# Patient Record
Sex: FEMALE
[De-identification: names, ages, dates, MRNs, and addresses within clinical notes are randomized per-mention and may not be internally consistent; named-entity substitution may affect disease eponyms.]

---

## 2017-02-28 ENCOUNTER — APPOINTMENT (OUTPATIENT)
Dept: GYNECOLOGIC ONCOLOGY | Facility: CLINIC | Age: 83
End: 2017-02-28

## 2017-02-28 VITALS
BODY MASS INDEX: 32.75 KG/M2 | SYSTOLIC BLOOD PRESSURE: 136 MMHG | DIASTOLIC BLOOD PRESSURE: 78 MMHG | HEIGHT: 57.87 IN | WEIGHT: 156 LBS

## 2017-02-28 DIAGNOSIS — I10 ESSENTIAL (PRIMARY) HYPERTENSION: ICD-10-CM

## 2017-02-28 DIAGNOSIS — Z86.39 PERSONAL HISTORY OF OTHER ENDOCRINE, NUTRITIONAL AND METABOLIC DISEASE: ICD-10-CM

## 2017-02-28 DIAGNOSIS — N94.9 UNSPECIFIED CONDITION ASSOCIATED WITH FEMALE GENITAL ORGANS AND MENSTRUAL CYCLE: ICD-10-CM

## 2017-05-24 ENCOUNTER — APPOINTMENT (OUTPATIENT)
Dept: OBGYN | Facility: CLINIC | Age: 83
End: 2017-05-24

## 2017-06-23 ENCOUNTER — APPOINTMENT (OUTPATIENT)
Dept: GYNECOLOGIC ONCOLOGY | Facility: HOSPITAL | Age: 83
End: 2017-06-23

## 2017-06-26 ENCOUNTER — APPOINTMENT (OUTPATIENT)
Dept: GYNECOLOGIC ONCOLOGY | Facility: CLINIC | Age: 83
End: 2017-06-26

## 2017-07-17 ENCOUNTER — APPOINTMENT (OUTPATIENT)
Dept: GYNECOLOGIC ONCOLOGY | Facility: CLINIC | Age: 83
End: 2017-07-17

## 2017-07-17 VITALS
DIASTOLIC BLOOD PRESSURE: 88 MMHG | SYSTOLIC BLOOD PRESSURE: 132 MMHG | TEMPERATURE: 97.7 F | BODY MASS INDEX: 31.5 KG/M2 | WEIGHT: 146 LBS | HEIGHT: 57 IN | HEART RATE: 64 BPM

## 2018-07-25 PROBLEM — N94.9 ADNEXAL MASS: Status: ACTIVE | Noted: 2017-02-28

## 2022-12-22 ENCOUNTER — NON-APPOINTMENT (OUTPATIENT)
Age: 88
End: 2022-12-22

## 2022-12-22 ENCOUNTER — APPOINTMENT (OUTPATIENT)
Dept: OPHTHALMOLOGY | Facility: CLINIC | Age: 88
End: 2022-12-22

## 2022-12-22 PROCEDURE — 99204 OFFICE O/P NEW MOD 45 MIN: CPT

## 2023-01-09 ENCOUNTER — APPOINTMENT (OUTPATIENT)
Dept: OPHTHALMOLOGY | Facility: CLINIC | Age: 89
End: 2023-01-09

## 2023-02-01 ENCOUNTER — APPOINTMENT (OUTPATIENT)
Dept: OPHTHALMOLOGY | Facility: EYE CENTER | Age: 89
End: 2023-02-01

## 2023-02-02 ENCOUNTER — APPOINTMENT (OUTPATIENT)
Dept: OPHTHALMOLOGY | Facility: CLINIC | Age: 89
End: 2023-02-02

## 2025-06-09 ENCOUNTER — INPATIENT (INPATIENT)
Facility: HOSPITAL | Age: 89
LOS: 10 days | Discharge: HOME HEALTH | End: 2025-06-20
Attending: SURGERY | Admitting: SURGERY
Payer: MEDICARE

## 2025-06-09 VITALS
DIASTOLIC BLOOD PRESSURE: 60 MMHG | TEMPERATURE: 98 F | RESPIRATION RATE: 18 BRPM | OXYGEN SATURATION: 95 % | HEART RATE: 86 BPM | SYSTOLIC BLOOD PRESSURE: 108 MMHG

## 2025-06-09 LAB
ALBUMIN SERPL ELPH-MCNC: 2.9 G/DL — LOW (ref 3.3–5)
ALP SERPL-CCNC: 105 U/L — SIGNIFICANT CHANGE UP (ref 40–120)
ALT FLD-CCNC: 16 U/L — SIGNIFICANT CHANGE UP (ref 4–33)
ANION GAP SERPL CALC-SCNC: 15 MMOL/L — HIGH (ref 7–14)
APPEARANCE UR: ABNORMAL
APTT BLD: 25.1 SEC — LOW (ref 26.1–36.8)
AST SERPL-CCNC: 22 U/L — SIGNIFICANT CHANGE UP (ref 4–32)
BASOPHILS # BLD AUTO: 0.04 K/UL — SIGNIFICANT CHANGE UP (ref 0–0.2)
BASOPHILS NFR BLD AUTO: 0.3 % — SIGNIFICANT CHANGE UP (ref 0–2)
BILIRUB SERPL-MCNC: 0.4 MG/DL — SIGNIFICANT CHANGE UP (ref 0.2–1.2)
BILIRUB UR-MCNC: NEGATIVE — SIGNIFICANT CHANGE UP
BLOOD GAS VENOUS COMPREHENSIVE RESULT: SIGNIFICANT CHANGE UP
BUN SERPL-MCNC: 17 MG/DL — SIGNIFICANT CHANGE UP (ref 7–23)
CALCIUM SERPL-MCNC: 8.7 MG/DL — SIGNIFICANT CHANGE UP (ref 8.4–10.5)
CHLORIDE SERPL-SCNC: 93 MMOL/L — LOW (ref 98–107)
CO2 SERPL-SCNC: 20 MMOL/L — LOW (ref 22–31)
COLOR SPEC: YELLOW — SIGNIFICANT CHANGE UP
CREAT SERPL-MCNC: 1.18 MG/DL — SIGNIFICANT CHANGE UP (ref 0.5–1.3)
DIFF PNL FLD: NEGATIVE — SIGNIFICANT CHANGE UP
EGFR: 43 ML/MIN/1.73M2 — LOW
EGFR: 43 ML/MIN/1.73M2 — LOW
EOSINOPHIL # BLD AUTO: 0.18 K/UL — SIGNIFICANT CHANGE UP (ref 0–0.5)
EOSINOPHIL NFR BLD AUTO: 1.2 % — SIGNIFICANT CHANGE UP (ref 0–6)
GLUCOSE SERPL-MCNC: 103 MG/DL — HIGH (ref 70–99)
GLUCOSE UR QL: NEGATIVE MG/DL — SIGNIFICANT CHANGE UP
HCT VFR BLD CALC: 27.3 % — LOW (ref 34.5–45)
HGB BLD-MCNC: 8.2 G/DL — LOW (ref 11.5–15.5)
IANC: 11.38 K/UL — HIGH (ref 1.8–7.4)
IMM GRANULOCYTES NFR BLD AUTO: 0.6 % — SIGNIFICANT CHANGE UP (ref 0–0.9)
INR BLD: 1.01 RATIO — SIGNIFICANT CHANGE UP (ref 0.85–1.16)
KETONES UR QL: NEGATIVE MG/DL — SIGNIFICANT CHANGE UP
LACTATE SERPL-SCNC: 2.3 MMOL/L — HIGH (ref 0.5–2)
LEUKOCYTE ESTERASE UR-ACNC: ABNORMAL
LIDOCAIN IGE QN: 36 U/L — SIGNIFICANT CHANGE UP (ref 7–60)
LYMPHOCYTES # BLD AUTO: 1.72 K/UL — SIGNIFICANT CHANGE UP (ref 1–3.3)
LYMPHOCYTES # BLD AUTO: 11.9 % — LOW (ref 13–44)
MCHC RBC-ENTMCNC: 20.9 PG — LOW (ref 27–34)
MCHC RBC-ENTMCNC: 30 G/DL — LOW (ref 32–36)
MCV RBC AUTO: 69.6 FL — LOW (ref 80–100)
MONOCYTES # BLD AUTO: 1.1 K/UL — HIGH (ref 0–0.9)
MONOCYTES NFR BLD AUTO: 7.6 % — SIGNIFICANT CHANGE UP (ref 2–14)
NEUTROPHILS # BLD AUTO: 11.38 K/UL — HIGH (ref 1.8–7.4)
NEUTROPHILS NFR BLD AUTO: 78.4 % — HIGH (ref 43–77)
NITRITE UR-MCNC: NEGATIVE — SIGNIFICANT CHANGE UP
NRBC # BLD AUTO: 0 K/UL — SIGNIFICANT CHANGE UP (ref 0–0)
NRBC # FLD: 0 K/UL — SIGNIFICANT CHANGE UP (ref 0–0)
NRBC BLD AUTO-RTO: 0 /100 WBCS — SIGNIFICANT CHANGE UP (ref 0–0)
PH UR: 8 — SIGNIFICANT CHANGE UP (ref 5–8)
PLATELET # BLD AUTO: 471 K/UL — HIGH (ref 150–400)
POTASSIUM SERPL-MCNC: 5.6 MMOL/L — HIGH (ref 3.5–5.3)
POTASSIUM SERPL-SCNC: 5.6 MMOL/L — HIGH (ref 3.5–5.3)
PROT SERPL-MCNC: 7.4 G/DL — SIGNIFICANT CHANGE UP (ref 6–8.3)
PROT UR-MCNC: NEGATIVE MG/DL — SIGNIFICANT CHANGE UP
PROTHROM AB SERPL-ACNC: 11.7 SEC — SIGNIFICANT CHANGE UP (ref 9.9–13.4)
RBC # BLD: 3.92 M/UL — SIGNIFICANT CHANGE UP (ref 3.8–5.2)
RBC # FLD: 22.8 % — HIGH (ref 10.3–14.5)
SODIUM SERPL-SCNC: 128 MMOL/L — LOW (ref 135–145)
SP GR SPEC: 1.01 — SIGNIFICANT CHANGE UP (ref 1–1.03)
UROBILINOGEN FLD QL: 1 MG/DL — SIGNIFICANT CHANGE UP (ref 0.2–1)
WBC # BLD: 14.51 K/UL — HIGH (ref 3.8–10.5)
WBC # FLD AUTO: 14.51 K/UL — HIGH (ref 3.8–10.5)

## 2025-06-09 PROCEDURE — 99285 EMERGENCY DEPT VISIT HI MDM: CPT | Mod: GC

## 2025-06-09 RX ORDER — ACETAMINOPHEN 500 MG/5ML
1000 LIQUID (ML) ORAL ONCE
Refills: 0 | Status: COMPLETED | OUTPATIENT
Start: 2025-06-09 | End: 2025-06-09

## 2025-06-09 RX ADMIN — Medication 400 MILLIGRAM(S): at 23:09

## 2025-06-09 NOTE — ED PROVIDER NOTE - CLINICAL SUMMARY MEDICAL DECISION MAKING FREE TEXT BOX
Patient with suprapubic and left lower quadrant tenderness concerning for UTI, intra-abdominal abscess patient with colon cancer, colon cancer related fever will obtain basic labs UA UC CT abdomen pelvis dispo pending results.

## 2025-06-09 NOTE — ED ADULT NURSE NOTE - OBJECTIVE STATEMENT
Patient received in room 3. Patient is AOx4, ambulatory at baseline, able to speak in full sentences, c/o fevers and abdominal pain. Patient has a past medical history of colon ca (patient unaware, but family at bedside states they don't want her to know), DM, cardiac stent, HTN, HLD. Patient c/o fevers, low blood pressures, and abdominal pain associated with blood in stool. Patient family endorses a recent colonoscopy on 6/5. Patient appears comfortable on stretcher in no signs of acute distress. VS noted. 20G placed in right wrist; labs drawn and sent. Patient pending CT scan. Patient denies chest pain, SOB, N/V, chills or weakness. Respirations even and unlabored, chest rise symmetrical b/l. Comfort measures maintained. Family at bedside. Bed in lowest position. Safety maintained.

## 2025-06-09 NOTE — ED PROVIDER NOTE - OBJECTIVE STATEMENT
89-year-old female PMH colon cancer, diabetes, CAD, hypertension, hyperlipidemia presenting with fever  Patient presents with fever Tmax 39.4 sporadic throughout the day.  Patient denies dysuria cough increased urinary frequency shortness of breath runny nose congestion.  Patient was recently diagnosed with colon cancer and last transfusion due to GI bleeding was last Thursday.  Patient also endorsing shooting pain down the left lower extremity coming from mid gluteal region.

## 2025-06-09 NOTE — ED PROVIDER NOTE - PHYSICAL EXAMINATION
GENERAL: NAD, lying in bed comfortably  HEAD:  Atraumatic, normocephalic  HEART: Regular rate and rhythm, no murmurs, rubs, or gallops  LUNGS: Unlabored respirations.  Clear to auscultation bilaterally, no crackles, wheezing, or rhonchi  ABDOMEN: Soft, Suprapubic tenderness and LLQ tenderness, nondistended, +BS, No CVA tenderness  EXTREMITIES: 2+ peripheral pulses bilaterally. No clubbing, cyanosis, or edema  NERVOUS SYSTEM:  A&Ox3, moving all extremities, no focal deficits   SKIN: No rashes or lesions

## 2025-06-09 NOTE — ED ADULT NURSE REASSESSMENT NOTE - NS ED NURSE REASSESS COMMENT FT1
Patient noted with fever; MD Blake made aware. Patient medicated per chart. 20G placed in LAC; labs drawn and sent. Urine collected and sent. Patient c/o shivering; Tylenol IVPB given. Respirations even and unlabored, chest rise symmetrical b/l. Comfort measures maintained. Bed in lowest position. Safety maintained.

## 2025-06-09 NOTE — ED PROVIDER NOTE - ATTENDING CONTRIBUTION TO CARE
Brief HPI:  89 year old F (Gibraltarian speaking, requesting son at bedside provide interpretation), recently diagnosed colon ca, cad, htn, hld presents with fever, diarrhea.  Patient with recent hospitalization at [facility] with newly diagnosed sigmoid adenocarcinoma without initation of treatment s/p colonoscopy on 5/29.  Since then family reports fever and blood tinged diarrhea.  No recent travel.  Patient is not on anticoagulation.  No reported recent abx use.  Denies abdominal pain.       Vitals:   Reviewed    Exam:    GEN:  Non-toxic appearing, non-distressed, speaking full sentences, non-diaphoretic, AAOx3  HEENT:  NCAT, neck supple, EOMI, PERRLA, sclera anicteric, no conjunctival pallor or injection, no stridor, normal voice, no tonsillar exudate, uvula midline  CV:  regular rhythm and rate, s1/s2 audible, no murmurs, rubs or gallops, peripheral pulses 2+ and symmetric  PULM:  non-labored respirations, lungs clear to auscultation bilaterally, no wheezes, crackles or rales  ABD:  non distended, non-tender, no rebound, no guarding, negative Espinoza's sign, bowel sounds normal, no cvat  MSK:  no gross deformity, non-tender extremities and joints, range of motion grossly normal appearing, no extremity edema, extremities warm and well perfused   NEURO:  AAOx3, CN II-XII intact, motor 5/5 in upper and lower extremities bilaterally, sensation grossly intact in extremities and trunk, no gait deficit  SKIN:  warm, dry, no rash or vesicles     A/P:   89 year old F (Gibraltarian speaking, requesting son at bedside provide interpretation), recently diagnosed colon ca, cad, htn, hld presents with fever, diarrhea.  VSS. Exam non-toxic appearing, abdomen non-tender.  Possible infectious diarrhea.  Given recent instrumentation and fever, will obtain ctap.  Disposition pending.

## 2025-06-09 NOTE — ED PROVIDER NOTE - PROGRESS NOTE DETAILS
Porfirio Farris MD PGY3: CT concerning for perforated diverticular versus perforation associated with cancer.  Discussed with patient's son, surgery consulted and will see patient. Abx given. Urine also with uti.

## 2025-06-09 NOTE — ED ADULT TRIAGE NOTE - CHIEF COMPLAINT QUOTE
pt coming from home, c/o fever and blood in stool x 3 days.  pt is s/p colonoscopy 6/5.  Hx: colon ca, DM, cardiac stent, HTN, HLD.  pt denies blood thinners.

## 2025-06-09 NOTE — ED PROVIDER NOTE - IV ALTEPLASE INCLUSION HIDDEN
show You received your rabies vaccine for day 0 and day 3. Please return to ED to receive your day 7 and day 14 of the rabies vaccine series.       RABIES VACCINE - General Information     Rabies Vaccine    WHAT YOU NEED TO KNOW:    What is the rabies vaccine? The rabies vaccine is an injection given to help prevent rabies. The rabies virus is spread to humans through the bite of an infected animal. Dogs, bats, skunks, coyotes, raccoons, and foxes are examples of animals that can carry rabies. The rabies vaccine can protect you from being infected with the virus. The vaccine can also prevent you from developing rabies even if you get it after you were bitten by an animal.     When is the vaccine given? Your healthcare provider will tell you how many doses of the vaccine you need. You may need booster shots if you are at high risk for rabies. The following is a common dosing schedule:     Before exposure to the virus, the vaccine is given in 3 doses. The first dose can be given at any time. The second dose is given 7 days after the first. The third dose is given 21 to 28 days after the first. Plan to get all of the doses 3 to 4 weeks before you travel.     After exposure to the virus, the vaccine is given in either 2 or 4 doses:   A person who has not already had the vaccine will usually get 4 doses. The first dose is given immediately. The other doses are given on days 3, 7, and 14 after the exposure. A shot called Rabies Immune Globulin is given at the same time as the first dose. This medicine helps increase your immune system to fight infection.     A person who has already had the vaccine will usually get 2 doses. The first is given immediately, and the second is given on day 3 after the exposure. Rabies Immune Globulin is not given.    Who should get the rabies vaccine?     Anyone who was bitten by an animal that can carry rabies may need the vaccine  Anyone at high risk for rabies, such as people who work with or care for animals or in a rabies laboratory  Anyone who goes into caves where bats live  Anyone who may have had contact with an infected animal  Anyone traveling to another country where rabies is common    Who should not get the rabies vaccine or should wait to get it?     Tell your healthcare provider if you had a severe allergic reaction to a dose of the rabies vaccine in the past, or to other vaccines. If you are getting the vaccine before exposure, do not get another dose. After exposure, you need to get all the doses even if you are at risk for an allergic reaction. Your healthcare provider may need to take extra precautions before you get another dose.    Tell your healthcare provider about all of your allergies. Also tell him if you have a disease that affects your immune system (such as HIV/AIDS) or you have cancer. Tell him if you are taking medicines that affect your immune system or any cancer treatment drug or radiation. Tell him if you are ill. You may need to wait to get the vaccine until you feel better.     What are the risks of the rabies vaccine? You may have a severe allergic reaction. The area where you got the shot may become red, swollen, or painful. You may develop a headache or muscle aches. You may have nausea or pain in your abdomen. You may develop rabies even after you get the vaccine.    Call 911 for any of the following:     Your mouth and throat are swollen.  You are wheezing or have trouble breathing.  You have chest pain or your heart is beating faster than normal for you.  You feel like you are going to faint.    When should I seek immediate care?     Your face is red or swollen.      You have hives that spread over your body.  You feel weak or dizzy.    When should I contact my healthcare provider?     You have increased pain, redness, or swelling around the area where the shot was given.  You have flu-like symptoms.    You have questions or concerns about the rabies vaccine.    CARE AGREEMENT:    You have the right to help plan your care. Learn about your health condition and how it may be treated. Discuss treatment options with your healthcare providers to decide what care you want to receive. You always have the right to refuse treatment. You received your rabies vaccine for day 0 and day 3. Please return to ED to receive your day 7 and day 14 of the rabies vaccine series on the following dates: 10/17/2022 and 10/24/2022.       RABIES VACCINE - General Information     Rabies Vaccine    WHAT YOU NEED TO KNOW:    What is the rabies vaccine? The rabies vaccine is an injection given to help prevent rabies. The rabies virus is spread to humans through the bite of an infected animal. Dogs, bats, skunks, coyotes, raccoons, and foxes are examples of animals that can carry rabies. The rabies vaccine can protect you from being infected with the virus. The vaccine can also prevent you from developing rabies even if you get it after you were bitten by an animal.     When is the vaccine given? Your healthcare provider will tell you how many doses of the vaccine you need. You may need booster shots if you are at high risk for rabies. The following is a common dosing schedule:     Before exposure to the virus, the vaccine is given in 3 doses. The first dose can be given at any time. The second dose is given 7 days after the first. The third dose is given 21 to 28 days after the first. Plan to get all of the doses 3 to 4 weeks before you travel.     After exposure to the virus, the vaccine is given in either 2 or 4 doses:   A person who has not already had the vaccine will usually get 4 doses. The first dose is given immediately. The other doses are given on days 3, 7, and 14 after the exposure. A shot called Rabies Immune Globulin is given at the same time as the first dose. This medicine helps increase your immune system to fight infection.     A person who has already had the vaccine will usually get 2 doses. The first is given immediately, and the second is given on day 3 after the exposure. Rabies Immune Globulin is not given.    Who should get the rabies vaccine?     Anyone who was bitten by an animal that can carry rabies may need the vaccine  Anyone at high risk for rabies, such as people who work with or care for animals or in a rabies laboratory  Anyone who goes into caves where bats live  Anyone who may have had contact with an infected animal  Anyone traveling to another country where rabies is common    Who should not get the rabies vaccine or should wait to get it?     Tell your healthcare provider if you had a severe allergic reaction to a dose of the rabies vaccine in the past, or to other vaccines. If you are getting the vaccine before exposure, do not get another dose. After exposure, you need to get all the doses even if you are at risk for an allergic reaction. Your healthcare provider may need to take extra precautions before you get another dose.    Tell your healthcare provider about all of your allergies. Also tell him if you have a disease that affects your immune system (such as HIV/AIDS) or you have cancer. Tell him if you are taking medicines that affect your immune system or any cancer treatment drug or radiation. Tell him if you are ill. You may need to wait to get the vaccine until you feel better.     What are the risks of the rabies vaccine? You may have a severe allergic reaction. The area where you got the shot may become red, swollen, or painful. You may develop a headache or muscle aches. You may have nausea or pain in your abdomen. You may develop rabies even after you get the vaccine.    Call 911 for any of the following:     Your mouth and throat are swollen.  You are wheezing or have trouble breathing.  You have chest pain or your heart is beating faster than normal for you.  You feel like you are going to faint.    When should I seek immediate care?     Your face is red or swollen.      You have hives that spread over your body.  You feel weak or dizzy.    When should I contact my healthcare provider?     You have increased pain, redness, or swelling around the area where the shot was given.  You have flu-like symptoms.    You have questions or concerns about the rabies vaccine.    CARE AGREEMENT:    You have the right to help plan your care. Learn about your health condition and how it may be treated. Discuss treatment options with your healthcare providers to decide what care you want to receive. You always have the right to refuse treatment.

## 2025-06-10 ENCOUNTER — TRANSCRIPTION ENCOUNTER (OUTPATIENT)
Age: 89
End: 2025-06-10

## 2025-06-10 DIAGNOSIS — K63.1 PERFORATION OF INTESTINE (NONTRAUMATIC): ICD-10-CM

## 2025-06-10 LAB
ANION GAP SERPL CALC-SCNC: 13 MMOL/L — SIGNIFICANT CHANGE UP (ref 7–14)
APPEARANCE UR: CLEAR — SIGNIFICANT CHANGE UP
BACTERIA # UR AUTO: ABNORMAL /HPF
BACTERIA # UR AUTO: NEGATIVE /HPF — SIGNIFICANT CHANGE UP
BILIRUB UR-MCNC: NEGATIVE — SIGNIFICANT CHANGE UP
BLD GP AB SCN SERPL QL: NEGATIVE — SIGNIFICANT CHANGE UP
BUN SERPL-MCNC: 15 MG/DL — SIGNIFICANT CHANGE UP (ref 7–23)
CALCIUM SERPL-MCNC: 8.6 MG/DL — SIGNIFICANT CHANGE UP (ref 8.4–10.5)
CAST: 1 /LPF — SIGNIFICANT CHANGE UP (ref 0–4)
CAST: 2 /LPF — SIGNIFICANT CHANGE UP (ref 0–4)
CHLORIDE SERPL-SCNC: 98 MMOL/L — SIGNIFICANT CHANGE UP (ref 98–107)
CO2 SERPL-SCNC: 22 MMOL/L — SIGNIFICANT CHANGE UP (ref 22–31)
COLOR SPEC: YELLOW — SIGNIFICANT CHANGE UP
CREAT SERPL-MCNC: 1.24 MG/DL — SIGNIFICANT CHANGE UP (ref 0.5–1.3)
DIFF PNL FLD: NEGATIVE — SIGNIFICANT CHANGE UP
EGFR: 41 ML/MIN/1.73M2 — LOW
EGFR: 41 ML/MIN/1.73M2 — LOW
GLUCOSE SERPL-MCNC: 128 MG/DL — HIGH (ref 70–99)
GLUCOSE UR QL: NEGATIVE MG/DL — SIGNIFICANT CHANGE UP
HCT VFR BLD CALC: 23.8 % — LOW (ref 34.5–45)
HCT VFR BLD CALC: 29.7 % — LOW (ref 34.5–45)
HGB BLD-MCNC: 7.2 G/DL — LOW (ref 11.5–15.5)
HGB BLD-MCNC: 9.1 G/DL — LOW (ref 11.5–15.5)
KETONES UR QL: NEGATIVE MG/DL — SIGNIFICANT CHANGE UP
LEUKOCYTE ESTERASE UR-ACNC: ABNORMAL
MAGNESIUM SERPL-MCNC: 2.4 MG/DL — SIGNIFICANT CHANGE UP (ref 1.6–2.6)
MCHC RBC-ENTMCNC: 20.9 PG — LOW (ref 27–34)
MCHC RBC-ENTMCNC: 21.5 PG — LOW (ref 27–34)
MCHC RBC-ENTMCNC: 30.3 G/DL — LOW (ref 32–36)
MCHC RBC-ENTMCNC: 30.6 G/DL — LOW (ref 32–36)
MCV RBC AUTO: 69 FL — LOW (ref 80–100)
MCV RBC AUTO: 70 FL — LOW (ref 80–100)
NITRITE UR-MCNC: NEGATIVE — SIGNIFICANT CHANGE UP
NRBC # BLD AUTO: 0 K/UL — SIGNIFICANT CHANGE UP (ref 0–0)
NRBC # BLD AUTO: 0 K/UL — SIGNIFICANT CHANGE UP (ref 0–0)
NRBC # FLD: 0 K/UL — SIGNIFICANT CHANGE UP (ref 0–0)
NRBC # FLD: 0 K/UL — SIGNIFICANT CHANGE UP (ref 0–0)
NRBC BLD AUTO-RTO: 0 /100 WBCS — SIGNIFICANT CHANGE UP (ref 0–0)
NRBC BLD AUTO-RTO: 0 /100 WBCS — SIGNIFICANT CHANGE UP (ref 0–0)
PH UR: 7.5 — SIGNIFICANT CHANGE UP (ref 5–8)
PHOSPHATE SERPL-MCNC: 4.2 MG/DL — SIGNIFICANT CHANGE UP (ref 2.5–4.5)
PLATELET # BLD AUTO: 479 K/UL — HIGH (ref 150–400)
PLATELET # BLD AUTO: 508 K/UL — HIGH (ref 150–400)
POTASSIUM SERPL-MCNC: 5.1 MMOL/L — SIGNIFICANT CHANGE UP (ref 3.5–5.3)
POTASSIUM SERPL-SCNC: 5.1 MMOL/L — SIGNIFICANT CHANGE UP (ref 3.5–5.3)
PROT UR-MCNC: NEGATIVE MG/DL — SIGNIFICANT CHANGE UP
RBC # BLD: 3.45 M/UL — LOW (ref 3.8–5.2)
RBC # BLD: 4.24 M/UL — SIGNIFICANT CHANGE UP (ref 3.8–5.2)
RBC # FLD: 21.6 % — HIGH (ref 10.3–14.5)
RBC # FLD: 22.6 % — HIGH (ref 10.3–14.5)
RBC CASTS # UR COMP ASSIST: 0 /HPF — SIGNIFICANT CHANGE UP (ref 0–4)
RBC CASTS # UR COMP ASSIST: 1 /HPF — SIGNIFICANT CHANGE UP (ref 0–4)
REVIEW: SIGNIFICANT CHANGE UP
RH IG SCN BLD-IMP: POSITIVE — SIGNIFICANT CHANGE UP
SODIUM SERPL-SCNC: 133 MMOL/L — LOW (ref 135–145)
SP GR SPEC: 1.01 — SIGNIFICANT CHANGE UP (ref 1–1.03)
SQUAMOUS # UR AUTO: 16 /HPF — HIGH (ref 0–5)
SQUAMOUS # UR AUTO: 4 /HPF — SIGNIFICANT CHANGE UP (ref 0–5)
UROBILINOGEN FLD QL: 0.2 MG/DL — SIGNIFICANT CHANGE UP (ref 0.2–1)
WBC # BLD: 12.87 K/UL — HIGH (ref 3.8–10.5)
WBC # BLD: 15.35 K/UL — HIGH (ref 3.8–10.5)
WBC # FLD AUTO: 12.87 K/UL — HIGH (ref 3.8–10.5)
WBC # FLD AUTO: 15.35 K/UL — HIGH (ref 3.8–10.5)
WBC UR QL: 46 /HPF — HIGH (ref 0–5)
WBC UR QL: 5 /HPF — SIGNIFICANT CHANGE UP (ref 0–5)

## 2025-06-10 PROCEDURE — 99497 ADVNCD CARE PLAN 30 MIN: CPT | Mod: 25

## 2025-06-10 PROCEDURE — 74177 CT ABD & PELVIS W/CONTRAST: CPT | Mod: 26

## 2025-06-10 PROCEDURE — 99223 1ST HOSP IP/OBS HIGH 75: CPT

## 2025-06-10 PROCEDURE — 93970 EXTREMITY STUDY: CPT | Mod: 26

## 2025-06-10 RX ORDER — SODIUM CHLORIDE 9 G/1000ML
1000 INJECTION, SOLUTION INTRAVENOUS
Refills: 0 | Status: DISCONTINUED | OUTPATIENT
Start: 2025-06-10 | End: 2025-06-11

## 2025-06-10 RX ORDER — ACETAMINOPHEN 500 MG/5ML
1000 LIQUID (ML) ORAL EVERY 6 HOURS
Refills: 0 | Status: COMPLETED | OUTPATIENT
Start: 2025-06-10 | End: 2025-06-10

## 2025-06-10 RX ORDER — PIPERACILLIN-TAZO-DEXTROSE,ISO 3.375G/5
3.38 IV SOLUTION, PIGGYBACK PREMIX FROZEN(ML) INTRAVENOUS EVERY 8 HOURS
Refills: 0 | Status: DISCONTINUED | OUTPATIENT
Start: 2025-06-10 | End: 2025-06-17

## 2025-06-10 RX ORDER — CEFTRIAXONE 500 MG/1
1000 INJECTION, POWDER, FOR SOLUTION INTRAMUSCULAR; INTRAVENOUS ONCE
Refills: 0 | Status: COMPLETED | OUTPATIENT
Start: 2025-06-10 | End: 2025-06-10

## 2025-06-10 RX ORDER — PIPERACILLIN-TAZO-DEXTROSE,ISO 3.375G/5
3.38 IV SOLUTION, PIGGYBACK PREMIX FROZEN(ML) INTRAVENOUS ONCE
Refills: 0 | Status: COMPLETED | OUTPATIENT
Start: 2025-06-10 | End: 2025-06-10

## 2025-06-10 RX ORDER — SODIUM ZIRCONIUM CYCLOSILICATE 5 G/5G
10 POWDER, FOR SUSPENSION ORAL ONCE
Refills: 0 | Status: COMPLETED | OUTPATIENT
Start: 2025-06-10 | End: 2025-06-10

## 2025-06-10 RX ADMIN — Medication 25 GRAM(S): at 11:00

## 2025-06-10 RX ADMIN — Medication 400 MILLIGRAM(S): at 23:39

## 2025-06-10 RX ADMIN — CEFTRIAXONE 100 MILLIGRAM(S): 500 INJECTION, POWDER, FOR SOLUTION INTRAMUSCULAR; INTRAVENOUS at 01:43

## 2025-06-10 RX ADMIN — Medication 400 MILLIGRAM(S): at 06:49

## 2025-06-10 RX ADMIN — SODIUM CHLORIDE 100 MILLILITER(S): 9 INJECTION, SOLUTION INTRAVENOUS at 11:00

## 2025-06-10 RX ADMIN — Medication 1000 MILLIGRAM(S): at 13:25

## 2025-06-10 RX ADMIN — Medication 1000 MILLILITER(S): at 00:08

## 2025-06-10 RX ADMIN — SODIUM CHLORIDE 100 MILLILITER(S): 9 INJECTION, SOLUTION INTRAVENOUS at 06:49

## 2025-06-10 RX ADMIN — SODIUM CHLORIDE 100 MILLILITER(S): 9 INJECTION, SOLUTION INTRAVENOUS at 23:39

## 2025-06-10 RX ADMIN — Medication 400 MILLIGRAM(S): at 12:25

## 2025-06-10 RX ADMIN — Medication 25 GRAM(S): at 18:45

## 2025-06-10 RX ADMIN — Medication 1000 MILLIGRAM(S): at 18:25

## 2025-06-10 RX ADMIN — SODIUM ZIRCONIUM CYCLOSILICATE 10 GRAM(S): 5 POWDER, FOR SUSPENSION ORAL at 00:34

## 2025-06-10 RX ADMIN — Medication 400 MILLIGRAM(S): at 17:25

## 2025-06-10 RX ADMIN — Medication 200 GRAM(S): at 03:49

## 2025-06-10 RX ADMIN — Medication 1000 MILLIGRAM(S): at 07:49

## 2025-06-10 NOTE — PATIENT PROFILE ADULT - FALL HARM RISK - HARM RISK INTERVENTIONS

## 2025-06-10 NOTE — CONSULT NOTE ADULT - ASSESSMENT
Interventional Radiology    Evaluate for Procedure: Abscess drain    HPI: 89F PMH DM, CAD, HTN, HLD, and recently diagnosed Colon Ca. Patient presenting with sporadic fevers at home for 1 week and weakness. Patient was recently diagnosed with colon cancer at [facility] where she was admitted for a lower GI bleed and low Hg of 6.5. Now readmitted for fever found to have a large RP abscess measuring up to 8cm. IR consulted for drainage placement.     Allergies: No Known Allergies    Medications (Abx/Cardiac/Anticoagulation/Blood Products)  cefTRIAXone   IVPB: 100 mL/Hr IV Intermittent (06-10 @ 01:43)  piperacillin/tazobactam IVPB...: 200 mL/Hr IV Intermittent (06-10 @ 03:49)    Data:  157.5  66.5  T(C): 37  HR: 86  BP: 128/55  RR: 16  SpO2: 96%    -WBC 12.87 / HgB 7.2 / Hct 23.8 / Plt 479  -Na 133 / Cl 98 / BUN 15 / Glucose 128  -K 5.1 / CO2 22 / Cr 1.24  -ALT -- / Alk Phos -- / T.Bili --  -INR 1.01 / PTT 25.1      Radiology: Complex multiloculated rim-enhancing collection measuring up to 8.1 cm within the left lower retroperitoneum.     Assessment/Plan: 89F PMH DM, CAD, HTN, HLD, and recently diagnosed Colon Ca presenting with RP abscess.     -- IR will plan to perform drainage on 6/11/205  -- NPO at midnight on 6/11/2025  -- hold a.m. anticoagulation on 6/11/2025  -- AM labs including coags.  -- please complete IR pre-procedure note  -- please place IR procedure request order under Dr. James.    --  Carter Aleman MD, PGY-1 Resident  Vascular and Interventional Radiology   Vascular and Interventional Radiology   Available on [employer] Teams    - Non-emergent consults: Place IR consult order in [place]  - Emergent issues (pager): [facility] [phone]; [facility] [phone]; [phone]  - Scheduling questions: [facility] [phone]; [facility] [phone]  - Clinic/outpatient booking: [facility] [phone]; [facility] [phone]

## 2025-06-10 NOTE — H&P ADULT - HISTORY OF PRESENT ILLNESS
89F PMH DM, CAD, HTN, HLD, and recently diagnosed Colon Ca. Patient presenting with sporadic fevers at home for 1 week and weakness. Patient was recently diagnosed with colon cancer at [facility] where she was admitted for a lower GI bleed and low Hg of 6.5, per son, patient had had blood in the stool for several months before admission. 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 Patient had never had a colonoscopy before this. TTE at OSH EF of 60%.     Seen and examined: Fever 100.4, HDS, Denies pain, TTP LLQ, WBC 14.5, lactate 2.3 improved to 0.7 after 1L of IVF. CT: Complex multiloculated rim-enhancing collection measuring up to 8.1 cm within the left lower retroperitoneum abuts a thickened segment of sigmoid colon, suspicious for perforated diverticulitis vs  colonic malignancy with perforation, additionally left common femoral vein is suspicious for DVT.

## 2025-06-10 NOTE — CONSULT NOTE ADULT - SUBJECTIVE AND OBJECTIVE BOX
[facility] Attending Consultation Note     Patient seen and evaluated at bedside    Chief Complaint: abd pain     HPI:  89F PMH DM, CAD, HTN, HLD, and recently diagnosed Colon Ca. Patient presenting with sporadic fevers at home for 1 week and weakness. Patient was recently diagnosed with colon cancer at [facility] where she was admitted for a lower GI bleed and low Hg of 6.5, per son, patient had had blood in the stool for several months before admission. 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 Patient had never had a colonoscopy before this. TTE at OSH EF of 60%.     Seen and examined: Fever 100.4, HDS, Denies pain, TTP LLQ, WBC 14.5, lactate 2.3 improved to 0.7 after 1L of IVF. CT: Complex multiloculated rim-enhancing collection measuring up to 8.1 cm within the left lower retroperitoneum abuts a thickened segment of sigmoid colon, suspicious for perforated diverticulitis vs  colonic malignancy with perforation, additionally left common femoral vein is suspicious for DVT. (10 Brian 2025 06:10)      PMHx:   Hypertension    CAD (coronary artery disease)        PSHx:       Allergies:  No Known Allergies      Home Meds:    Current Medications:   acetaminophen   IVPB .. 1000 milliGRAM(s) IV Intermittent every 6 hours  lactated ringers. 1000 milliLiter(s) IV Continuous <Continuous>  piperacillin/tazobactam IVPB.. 3.375 Gram(s) IV Intermittent every 8 hours      FAMILY HISTORY:      Social History: Personally reviewed   No tobacco, EtOH or IVDU     REVIEW OF SYSTEMS:  Constitutional:     [x ] negative [ ] fevers [ ] chills [ ] weight loss [ ] weight gain  HEENT:                  [x ] negative [ ] dry eyes [ ] eye irritation [ ] postnasal drip [ ] nasal congestion  CV:                         [ x] negative  [ ] chest pain [ ] orthopnea [ ] palpitations [ ] murmur  Resp:                     [x ] negative [ ] cough [ ] shortness of breath [ ] dyspnea [ ] wheezing [ ] sputum [ ]hemoptysis  GI:                          [ x] negative [ ] nausea [ ] vomiting [ ] diarrhea [ ] constipation [ ] abd pain [ ] dysphagia   :                        [ x] negative [ ] dysuria [ ] nocturia [ ] hematuria [ ] increased urinary frequency  Musculoskeletal: [x ] negative [ ] back pain [ ] myalgias [ ] arthralgias [ ] fracture  Skin:                       [ x] negative [ ] rash [ ] itch  Neurological:        [ x] negative [ ] headache [ ] dizziness [ ] syncope [ ] weakness [ ] numbness  Psychiatric:           [ x] negative [ ] anxiety [ ] depression  Endocrine:            [ x] negative [ ] diabetes [ ] thyroid problem  Heme/Lymph:      [ x] negative [ ] anemia [ ] bleeding problem  Allergic/Immune: [ x] negative [ ] itchy eyes [ ] nasal discharge [ ] hives [ ] angioedema    [ x] All other systems negative  [ ] Unable to assess ROS due to      Physical Exam:  T(F): 97.9 (06-10), Max: 100.4 (06-09)  HR: 86 (06-10) (82 - 92)  BP: 139/55 (06-10) (105/52 - 139/55)  RR: 18 (06-10)  SpO2: 96% (06-10)    Gen: Well appearing   HENNT: No JVP   CV: regular rate, regular rhtyhm, no murmur   Pulm: clear to auscultation bilaterally   Abdomen: TTP LLQ   Ext: no edema b/l   Neuro: grossly non-focal     Cardiovascular Diagnostic Testing:      Labs: Personally reviewed                        9.1    15.35 )-----------( 508      ( 10 Brian 2025 18:25 )             29.7     06-10    133[L]  |  98  |  15  ----------------------------<  128[H]  5.1   |  22  |  1.24    Ca    8.6      10 Brian 2025 06:23  Phos  4.2     06-10  Mg     2.40     06-10    TPro  7.4  /  Alb  2.9[L]  /  TBili  0.4  /  DBili  x   /  AST  22  /  ALT  16  /  AlkPhos  105  06-09    PT/INR - ( 09 Jun 2025 21:39 )   PT: 11.7 sec;   INR: 1.01 ratio         PTT - ( 09 Jun 2025 21:39 )  PTT:25.1 sec

## 2025-06-10 NOTE — H&P ADULT - NSHPLABSRESULTS_GEN_ALL_CORE
LABS:                        8.2    14.51 )-----------( 471      ( 2025 21:39 )             27.3     06-09    128[L]  |  93[L]  |  17  ----------------------------<  103[H]  5.6[H]   |  20[L]  |  1.18    Ca    8.7      2025 21:39    TPro  7.4  /  Alb  2.9[L]  /  TBili  0.4  /  DBili  x   /  AST  22  /  ALT  16  /  AlkPhos  105  06-09    PT/INR - ( 2025 21:39 )   PT: 11.7 sec;   INR: 1.01 ratio         PTT - ( 2025 21:39 )  PTT:25.1 sec      Urinalysis Basic - ( 2025 23:17 )    Color: Yellow / Appearance: Cloudy / S.007 / pH: x  Gluc: x / Ketone: x  / Bili: Negative / Urobili: 1.0 mg/dL   Blood: x / Protein: Negative mg/dL / Nitrite: Negative   Leuk Esterase: Large / RBC: 1 /HPF / WBC 46 /HPF   Sq Epi: x / Non Sq Epi: 16 /HPF / Bacteria: Moderate /HPF        Urinalysis with Rflx Culture (collected 2025 23:17)

## 2025-06-10 NOTE — CHART NOTE - NSCHARTNOTEFT_GEN_A_CORE
PRE-INTERVENTIONAL RADIOLOGY PROCEDURE NOTE      -- IR will plan to perform drainage on 6/11/205  -- NPO at midnight on 6/11/2025  -- hold a.m. anticoagulation on 6/11/2025  -- AM labs including coags.  -- please complete IR pre-procedure note  -- please place IR procedure request order under Dr. James.      Patient Age: 89    Patient Gender: F     Procedure:  RP abscess drainage under IR guidance     Diagnosis/Indication: RP abscess     Interventional Radiology Attending Physician: Jacob BALDWIN    Ordering Attending Physician: Rosemary BALDWIN    Pertinent Medical History:    Pertinent labs:                      7.2    12.87 )-----------( 479      ( 10 Brian 2025 06:23 )             23.8       06-10    133[L]  |  98  |  15  ----------------------------<  128[H]  5.1   |  22  |  1.24    Ca    8.6      10 Brian 2025 06:23  Phos  4.2     06-10  Mg     2.40     06-10    TPro  7.4  /  Alb  2.9[L]  /  TBili  0.4  /  DBili  x   /  AST  22  /  ALT  16  /  AlkPhos  105  06-09      PT/INR - ( 09 Jun 2025 21:39 )   PT: 11.7 sec;   INR: 1.01 ratio         PTT - ( 09 Jun 2025 21:39 )  PTT:25.1 sec        Patient and Family Aware ? Yes PRE-INTERVENTIONAL RADIOLOGY PROCEDURE NOTE      Patient Age: 89    Patient Gender: F     Procedure:  RP abscess drainage under IR guidance     Diagnosis/Indication: RP abscess     Interventional Radiology Attending Physician: Jacob BALDWIN    Ordering Attending Physician: Rosemary BALDWIN    Pertinent Medical History:    Pertinent labs:                      7.2    12.87 )-----------( 479      ( 10 Brian 2025 06:23 )             23.8       06-10    133[L]  |  98  |  15  ----------------------------<  128[H]  5.1   |  22  |  1.24    Ca    8.6      10 Brian 2025 06:23  Phos  4.2     06-10  Mg     2.40     06-10    TPro  7.4  /  Alb  2.9[L]  /  TBili  0.4  /  DBili  x   /  AST  22  /  ALT  16  /  AlkPhos  105  06-09      PT/INR - ( 09 Jun 2025 21:39 )   PT: 11.7 sec;   INR: 1.01 ratio         PTT - ( 09 Jun 2025 21:39 )  PTT:25.1 sec        Patient and Family Aware ? Yes

## 2025-06-10 NOTE — H&P ADULT - ASSESSMENT
89F PMH DM, CAD, HTN, HLD, and recently diagnosed Colon Ca. Here qwith fevers and weakness.  Found to have perforated Colon Ca with LLQ collection. Fever 100.4, HDS, Denies pain, TTP LLQ, WBC 14.5, CT: Complex multiloculated rim-enhancing collection measuring up to 8.1 cm within the left lower retroperitoneum abuts a thickened segment of sigmoid colon,  additionally left common femoral vein is suspicious for DVT.    PLAN:  - Admit to Colorectal surgery  - NPO/IVF  - IV antibiotics  - IR consult for drainage of abscess.   - Pain medication  - Tumor markers.   - LLE duplex.       Discussed with Dr Rosemary Simmons PGY3  A Team surgery  [id number]

## 2025-06-10 NOTE — CONSULT NOTE ADULT - ASSESSMENT
89F PMH DM, CAD, HTN, HLD, and recently diagnosed Colon Ca. Patient presenting with sporadic fevers at home for 1 week and weakness. Patient was recently diagnosed with colon cancer at [facility] where she was admitted for a lower GI bleed and low Hg of 6.5, per son, patient had had blood in the stool for several months before admission. At [facility] patient had serval blood transfusions and had a CT scan showing  mural thickening and adjacent stranding of proximal sigmoid colon, suspicious of mural neoplasm, following colonoscopy showed  melanosis in colon,  fungating infiltrative polypoid and ulcerated partially obstructing large mass was near to sigmoid colon (per chart reviwe) . Incidental left common femoral vein filling defect noted on CT AP imaging on this admission . Vascular surgery consulted for evaluation for possible left lower extremity DVT            89F PMH DM, CAD, HTN, HLD, and recently diagnosed Colon Ca. Patient presenting with sporadic fevers at home for 1 week and weakness. Patient was recently diagnosed with colon cancer at [facility] where she was admitted for a lower GI bleed and low Hg of 6.5, per son, patient had had blood in the stool for several months before admission. At [facility] patient had serval blood transfusions and had a CT scan showing  mural thickening and adjacent stranding of proximal sigmoid colon, suspicious of mural neoplasm, following colonoscopy showed  melanosis in colon,  fungating infiltrative polypoid and ulcerated partially obstructing large mass was near to sigmoid colon (per chart reviwe) . Incidental left common femoral vein filling defect noted on CT AP imaging on this admission . Vascular surgery consulted for evaluation for possible left lower extremity DVT       #LLE CFV DVT ?    Plan     -Obtain duplex of lower extremities   -will d/w C team attending  89F PMH DM, CAD, HTN, HLD, and recently diagnosed Colon Ca. Patient presenting with sporadic fevers at home for 1 week and weakness. Patient was recently diagnosed with colon cancer at [facility] where she was admitted for a lower GI bleed and low Hg of 6.5, per son, patient had had blood in the stool for several months before admission. At [facility] patient had serval blood transfusions and had a CT scan showing  mural thickening and adjacent stranding of proximal sigmoid colon, suspicious of mural neoplasm, following colonoscopy showed  melanosis in colon,  fungating infiltrative polypoid and ulcerated partially obstructing large mass was near to sigmoid colon (per chart reviwe) . Incidental left common femoral vein filling defect noted on CT AP imaging on this admission . Vascular surgery consulted for evaluation for possible left lower extremity DVT       #LLE CFV DVT ?    Plan     -Obtain duplex of lower extremities   - C team to follow for now 89F PMH DM, CAD, HTN, HLD, and recently diagnosed Colon Ca. Patient presenting with sporadic fevers at home for 1 week and weakness. Patient was recently diagnosed with colon cancer at [facility] where she was admitted for a lower GI bleed and low Hg of 6.5, per son, patient had had blood in the stool for several months before admission. At [facility] patient had serval blood transfusions and had a CT scan showing  mural thickening and adjacent stranding of proximal sigmoid colon, suspicious of mural neoplasm, following colonoscopy showed  melanosis in colon,  fungating infiltrative polypoid and ulcerated partially obstructing large mass was near to sigmoid colon (per chart reviwe) . Incidental left common femoral vein filling defect noted on CTAP imaging on this admission (6/10/25) . Vascular surgery consulted for evaluation for possible left lower extremity DVT       #LLE CFV DVT ?    Plan     -Obtain duplex of lower extremities   - C team to follow for now 89F PMH DM, CAD, HTN, HLD, and recently diagnosed Colon Ca. Patient presenting with sporadic fevers at home for 1 week and weakness. Patient was recently diagnosed with colon cancer at [facility] where she was admitted for a lower GI bleed and low Hg of 6.5, per son, patient had had blood in the stool for several months before admission. At [facility] patient had serval blood transfusions and had a CT scan showing  mural thickening and adjacent stranding of proximal sigmoid colon, suspicious of mural neoplasm, following colonoscopy showed  melanosis in colon,  fungating infiltrative polypoid and ulcerated partially obstructing large mass was near to sigmoid colon (per chart reviwe) . Incidental left common femoral vein filling defect noted on CTAP imaging on this admission (6/10/25) . Vascular surgery consulted for evaluation for possible left lower extremity DVT     Plan :  -Obtain duplex of lower extremities   - IR consult for IVC filter placement  - C team to follow for now    Plans discussed with the vascular fellow on call on behalf of the attending.    C-Team  [id number] 89F PMH DM, CAD, HTN, HLD, and recently diagnosed Colon Ca. Patient presenting with sporadic fevers at home for 1 week and weakness. Patient was recently diagnosed with colon cancer at [facility] where she was admitted for a lower GI bleed and low Hg of 6.5, per son, patient had had blood in the stool for several months before admission. At [facility] patient had serval blood transfusions and had a CT scan showing  mural thickening and adjacent stranding of proximal sigmoid colon, suspicious of mural neoplasm, following colonoscopy showed  melanosis in colon,  fungating infiltrative polypoid and ulcerated partially obstructing large mass was near to sigmoid colon (per chart reviwe) . Incidental left common femoral vein filling defect noted on CTAP imaging on this admission (6/10/25) . Vascular surgery consulted for evaluation for possible left lower extremity DVT     Plan :  -Obtain duplex of lower extremities   - IR consult for IVC filter placement vs vascular pending on time  - C team to follow for now    Plans discussed with the vascular fellow on call on behalf of the attending.    C-Team  [id number]

## 2025-06-10 NOTE — ED ADULT NURSE REASSESSMENT NOTE - NS ED NURSE REASSESS COMMENT FT1
Report received from radha RN. Patient is alert and in no signs of acute distress. Patient medicated per chart. VS noted; MD Miller made aware of patients BP; IVF infusing. Repeat lactate s/p IVF. Comfort measures maintained. Bed in lowest position. Safety maintained.

## 2025-06-10 NOTE — H&P ADULT - NSHPPHYSICALEXAM_GEN_ALL_CORE
LOS:     VITALS:   T(C): 36.9 (06-10-25 @ 02:06), Max: 38 (06-09-25 @ 22:50)  HR: 92 (06-10-25 @ 02:06) (86 - 92)  BP: 117/48 (06-10-25 @ 02:06) (108/60 - 117/48)  RR: 19 (06-10-25 @ 02:06) (18 - 20)  SpO2: 95% (06-10-25 @ 02:06) (95% - 100%)    GENERAL: NAD, lying in bed comfortably  CHEST/LUNG: Unlabored respirations  HEART: Regular rate and rhythm  ABDOMEN: Soft, TTP in LLQ, nondistended  EXTREMITIES:  No clubbing, cyanosis, or edema  NERVOUS SYSTEM:  A&Ox3

## 2025-06-10 NOTE — H&P ADULT - ATTENDING COMMENTS
pt with fever and slight pain, recent colonoscopy with fungating tumor biopsy proven adenocarcinoma  aaox3  abd soft ND, focally tender LLQ, no peritonitis    CT with perforated tumor with abscess  + UTI on u/a  L CF DVT on CT    The patient lives with her son and his family but is independent and a quite active 89 year old  I had a long discussion with her and her son about surgery to resect the tumor. However I would first have her seen by cardiology (h/o MI x2 with no recent cards eval), vascular for the DVT (may need filter given recent bleeding hx), and optimize her prior to OR. The perforation is contained so no need for emergent OR. Would plan for OR during this admission  IR consult for drainage of abscess  IV abx

## 2025-06-10 NOTE — CONSULT NOTE ADULT - ASSESSMENT
89F w/ PMH of HTN, HLD, T2DM, CAD here for new dx of colonic mass likely colon CA admitted for fever, large RP mass, and possible LLE DVT. Cardiology consulted for preop cardiac eval for IR drain of the the retroperitoneal abscess.     -TTE ECHO at OSH reportedly with preserved EF, no significant valvular heart disease   -recommend repeat TTE ECHO inpt   -US w/ L above the knee DVT, if unable to AC, rec IVC filter   -NPO currently, restart po meds when able   -cont abx per primary team   -euvolemic, no signs of ACS, decompensated HF or critical valvular heart disease, optimized from cardiac standpoint to proceed     Flavio Harris MD [facility]  Attending Interventional Cardiologist, [facility]en-NS/GENARO.   [employer] Team

## 2025-06-10 NOTE — CONSULT NOTE ADULT - SUBJECTIVE AND OBJECTIVE BOX
89F PMH DM, CAD, HTN, HLD, and recently diagnosed Colon Ca. Patient presenting with sporadic fevers at home for 1 week and weakness. Patient was recently diagnosed with colon cancer at [facility] where she was admitted for a lower GI bleed and low Hg of 6.5, per son, patient had had blood in the stool for several months before admission. 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 Patient had never had a colonoscopy before this. TTE at OSH EF of 60%.     Seen and examined: Fever 100.4, HDS, Denies pain, TTP LLQ, WBC 14.5, lactate 2.3 improved to 0.7 after 1L of IVF. CT: Complex multiloculated rim-enhancing collection measuring up to 8.1 cm within the left lower retroperitoneum abuts a thickened segment of sigmoid colon, suspicious for perforated diverticulitis vs  colonic malignancy with perforation, additionally left common femoral vein is suspicious for DVT.  Vascular surgery consulted for findings above for evaluation and possible treatment options       SICU Consultation Note  =====================================================  HPI: 89y Female  HPI:  89F PMH DM, CAD, HTN, HLD, and recently diagnosed Colon Ca. Patient presenting with sporadic fevers at home for 1 week and weakness. Patient was recently diagnosed with colon cancer at [facility] where she was admitted for a lower GI bleed and low Hg of 6.5, per son, patient had had blood in the stool for several months before admission. 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 Patient had never had a colonoscopy before this. TTE at OSH EF of 60%.     Seen and examined: Fever 100.4, HDS, Denies pain, TTP LLQ, WBC 14.5, lactate 2.3 improved to 0.7 after 1L of IVF. CT: Complex multiloculated rim-enhancing collection measuring up to 8.1 cm within the left lower retroperitoneum abuts a thickened segment of sigmoid colon, suspicious for perforated diverticulitis vs  colonic malignancy with perforation, additionally left common femoral vein is suspicious for DVT. (10 Brian 2025 06:10)      Surgery Information  OR time:      EBL:          IV Fluids:       Blood Products:   UOP:          PAST MEDICAL & SURGICAL HISTORY:  Hypertension      CAD (coronary artery disease)        Home Meds: Home Medications:    Allergies: Allergies    No Known Allergies    Intolerances      Soc:   Advanced Directives: Presumed Full Code     ROS:    REVIEW OF SYSTEMS    [ ] A ten-point review of systems was otherwise negative except as noted.  [ ] Due to altered mental status/intubation, subjective information were not able to be obtained from the patient. History was obtained, to the extent possible, from review of the chart and collateral sources of information.      CURRENT MEDICATIONS:   --------------------------------------------------------------------------------------  Neurologic Medications  acetaminophen   IVPB .. 1000 milliGRAM(s) IV Intermittent every 6 hours    Respiratory Medications    Cardiovascular Medications    Gastrointestinal Medications  lactated ringers. 1000 milliLiter(s) IV Continuous <Continuous>    Genitourinary Medications    Hematologic/Oncologic Medications    Antimicrobial/Immunologic Medications  piperacillin/tazobactam IVPB.. 3.375 Gram(s) IV Intermittent every 8 hours    Endocrine/Metabolic Medications    Topical/Other Medications    --------------------------------------------------------------------------------------    VITAL SIGNS, INS/OUTS (last 24 hours):  --------------------------------------------------------------------------------------  ICU Vital Signs Last 24 Hrs  T(C): 37 (10 Brain 2025 05:32), Max: 38 (09 Jun 2025 22:50)  T(F): 98.6 (10 Brian 2025 05:32), Max: 100.4 (09 Jun 2025 22:50)  HR: 86 (10 Brian 2025 05:32) (86 - 92)  BP: 128/55 (10 Brian 2025 05:32) (108/60 - 128/55)  BP(mean): 64 (10 Brian 2025 02:06) (64 - 65)  ABP: --  ABP(mean): --  RR: 16 (10 Brian 2025 05:32) (16 - 20)  SpO2: 96% (10 Brian 2025 05:32) (95% - 100%)    O2 Parameters below as of 10 Brian 2025 05:32  Patient On (Oxygen Delivery Method): room air          I&O's Summary    09 Jun 2025 07:01  -  10 Brian 2025 07:00  --------------------------------------------------------  IN: 100 mL / OUT: 0 mL / NET: 100 mL      --------------------------------------------------------------------------------------      GENERAL: NAD, lying in bed comfortably  CHEST/LUNG: Unlabored respirations  HEART: Regular rate and rhythm  ABDOMEN: Soft, TTP in LLQ, nondistended  EXTREMITIES:  No clubbing, cyanosis, or edema  NERVOUS SYSTEM:  A&Ox3    LABS  --------------------------------------------------------------------------------------  Labs:  CAPILLARY BLOOD GLUCOSE      POCT Blood Glucose.: 122 mg/dL (10 Brian 2025 01:44)  POCT Blood Glucose.: 155 mg/dL (09 Jun 2025 18:56)                          7.2    12.87 )-----------( 479      ( 10 Brian 2025 06:23 )             23.8       Auto Immature Granulocyte %: 0.6 % (06-09-25 @ 21:39)    06-10    133[L]  |  98  |  15  ----------------------------<  128[H]  5.1   |  22  |  1.24      Calcium: 8.6 mg/dL (06-10-25 @ 06:23)      LFTs:             7.4  | 0.4  | 22       ------------------[105     ( 09 Jun 2025 21:39 )  2.9  | x    | 16          Lipase:36     Amylase:x         Lactate, Blood: 0.7 mmol/L (06-10-25 @ 02:20)  Lactate, Blood: 2.3 mmol/L (06-09-25 @ 22:55)  Blood Gas Venous - Lactate: 2.8 mmol/L (06-09-25 @ 22:55)      Coags:     11.7   ----< 1.01    ( 09 Jun 2025 21:39 )     25.1                Urinalysis Basic - ( 10 Brian 2025 06:23 )    Color: x / Appearance: x / SG: x / pH: x  Gluc: 128 mg/dL / Ketone: x  / Bili: x / Urobili: x   Blood: x / Protein: x / Nitrite: x   Leuk Esterase: x / RBC: x / WBC x   Sq Epi: x / Non Sq Epi: x / Bacteria: x        Urinalysis with Rflx Culture (collected 09 Jun 2025 23:17)        --------------------------------------------------------------------------------------    OTHER LAB 89F PMH DM, CAD, HTN, HLD, and recently diagnosed Colon Ca. Patient presenting with sporadic fevers at home for 1 week and weakness. Patient was recently diagnosed with colon cancer at [facility] where she was admitted for a lower GI bleed and low Hg of 6.5, per son, patient had had blood in the stool for several months before admission. 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 Patient had never had a colonoscopy before this. TTE at OSH EF of 60%.     Seen and examined: Fever 100.4, HDS, Denies pain, TTP LLQ, WBC 14.5, lactate 2.3 improved to 0.7 after 1L of IVF. CT: Complex multiloculated rim-enhancing collection measuring up to 8.1 cm within the left lower retroperitoneum abuts a thickened segment of sigmoid colon, suspicious for perforated diverticulitis vs  colonic malignancy with perforation, additionally left common femoral vein is suspicious for DVT.  Vascular surgery consulted for findings above for evaluation and possible treatment options       PAST MEDICAL & SURGICAL HISTORY:  Hypertension      CAD (coronary artery disease)        Home Meds: Home Medications:    Allergies: Allergies    No Known Allergies    Intolerances      Soc:   Advanced Directives: Presumed Full Code     ROS:    REVIEW OF SYSTEMS    [x] A ten-point review of systems was otherwise negative except as noted.    CURRENT MEDICATIONS:   --------------------------------------------------------------------------------------  Neurologic Medications  acetaminophen   IVPB .. 1000 milliGRAM(s) IV Intermittent every 6 hours    Respiratory Medications    Cardiovascular Medications    Gastrointestinal Medications  lactated ringers. 1000 milliLiter(s) IV Continuous <Continuous>    Genitourinary Medications    Hematologic/Oncologic Medications    Antimicrobial/Immunologic Medications  piperacillin/tazobactam IVPB.. 3.375 Gram(s) IV Intermittent every 8 hours    Endocrine/Metabolic Medications    Topical/Other Medications    --------------------------------------------------------------------------------------    VITAL SIGNS, INS/OUTS (last 24 hours):  --------------------------------------------------------------------------------------  ICU Vital Signs Last 24 Hrs  T(C): 37 (10 Brian 2025 05:32), Max: 38 (09 Jun 2025 22:50)  T(F): 98.6 (10 Brian 2025 05:32), Max: 100.4 (09 Jun 2025 22:50)  HR: 86 (10 Brian 2025 05:32) (86 - 92)  BP: 128/55 (10 Brian 2025 05:32) (108/60 - 128/55)  BP(mean): 64 (10 Brian 2025 02:06) (64 - 65)  ABP: --  ABP(mean): --  RR: 16 (10 Brian 2025 05:32) (16 - 20)  SpO2: 96% (10 Brian 2025 05:32) (95% - 100%)    O2 Parameters below as of 10 Brian 2025 05:32  Patient On (Oxygen Delivery Method): room air          I&O's Summary    09 Jun 2025 07:01  -  10 Brian 2025 07:00  --------------------------------------------------------  IN: 100 mL / OUT: 0 mL / NET: 100 mL      --------------------------------------------------------------------------------------      GENERAL: NAD, lying in bed comfortably  CHEST/LUNG: Unlabored respirations  HEART: Regular rate and rhythm  ABDOMEN: Soft, TTP in LLQ, nondistended  EXTREMITIES:  No clubbing, cyanosis, or edema b/l calf pain   NERVOUS SYSTEM:  A&Ox3    LABS  --------------------------------------------------------------------------------------  Labs:  CAPILLARY BLOOD GLUCOSE      POCT Blood Glucose.: 122 mg/dL (10 Brian 2025 01:44)  POCT Blood Glucose.: 155 mg/dL (09 Jun 2025 18:56)                          7.2    12.87 )-----------( 479      ( 10 Brian 2025 06:23 )             23.8       Auto Immature Granulocyte %: 0.6 % (06-09-25 @ 21:39)    06-10    133[L]  |  98  |  15  ----------------------------<  128[H]  5.1   |  22  |  1.24      Calcium: 8.6 mg/dL (06-10-25 @ 06:23)      LFTs:             7.4  | 0.4  | 22       ------------------[105     ( 09 Jun 2025 21:39 )  2.9  | x    | 16          Lipase:36     Amylase:x         Lactate, Blood: 0.7 mmol/L (06-10-25 @ 02:20)  Lactate, Blood: 2.3 mmol/L (06-09-25 @ 22:55)  Blood Gas Venous - Lactate: 2.8 mmol/L (06-09-25 @ 22:55)      Coags:     11.7   ----< 1.01    ( 09 Jun 2025 21:39 )     25.1                Urinalysis Basic - ( 10 Brian 2025 06:23 )    Color: x / Appearance: x / SG: x / pH: x  Gluc: 128 mg/dL / Ketone: x  / Bili: x / Urobili: x   Blood: x / Protein: x / Nitrite: x   Leuk Esterase: x / RBC: x / WBC x   Sq Epi: x / Non Sq Epi: x / Bacteria: x        Urinalysis with Rflx Culture (collected 09 Jun 2025 23:17)        --------------------------------------------------------------------------------------    OTHER LAB 89F PMH DM, CAD, HTN, HLD, and recently diagnosed Colon Ca. Patient presenting with sporadic fevers at home for 1 week and weakness. Patient was recently diagnosed with colon cancer at [facility] where she was admitted for a lower GI bleed and low Hg of 6.5, per son, patient had had blood in the stool for several months before admission. 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 Patient had never had a colonoscopy before this. TTE at OSH EF of 60%.     Seen and examined: Fever 100.4, HDS, Denies pain, TTP LLQ, WBC 14.5, lactate 2.3 improved to 0.7 after 1L of IVF. CT: Complex multiloculated rim-enhancing collection measuring up to 8.1 cm within the left lower retroperitoneum abuts a thickened segment of sigmoid colon, suspicious for perforated diverticulitis vs  colonic malignancy with perforation, additionally left common femoral vein is suspicious for DVT 2/2 central tubular filling defect on CT imaging.  Vascular surgery consulted for findings above for evaluation and possible treatment options       PAST MEDICAL & SURGICAL HISTORY:  Hypertension      CAD (coronary artery disease)        Home Meds: Home Medications:    Allergies: Allergies    No Known Allergies    Intolerances      Soc:   Advanced Directives: Presumed Full Code     ROS:    REVIEW OF SYSTEMS    [x] A ten-point review of systems was otherwise negative except as noted.    CURRENT MEDICATIONS:   --------------------------------------------------------------------------------------  Neurologic Medications  acetaminophen   IVPB .. 1000 milliGRAM(s) IV Intermittent every 6 hours    Respiratory Medications    Cardiovascular Medications    Gastrointestinal Medications  lactated ringers. 1000 milliLiter(s) IV Continuous <Continuous>    Genitourinary Medications    Hematologic/Oncologic Medications    Antimicrobial/Immunologic Medications  piperacillin/tazobactam IVPB.. 3.375 Gram(s) IV Intermittent every 8 hours    Endocrine/Metabolic Medications    Topical/Other Medications    --------------------------------------------------------------------------------------    VITAL SIGNS, INS/OUTS (last 24 hours):  --------------------------------------------------------------------------------------  ICU Vital Signs Last 24 Hrs  T(C): 37 (10 Brian 2025 05:32), Max: 38 (09 Jun 2025 22:50)  T(F): 98.6 (10 Brian 2025 05:32), Max: 100.4 (09 Jun 2025 22:50)  HR: 86 (10 Brian 2025 05:32) (86 - 92)  BP: 128/55 (10 Brian 2025 05:32) (108/60 - 128/55)  BP(mean): 64 (10 Brian 2025 02:06) (64 - 65)  ABP: --  ABP(mean): --  RR: 16 (10 Brian 2025 05:32) (16 - 20)  SpO2: 96% (10 Brian 2025 05:32) (95% - 100%)    O2 Parameters below as of 10 Brian 2025 05:32  Patient On (Oxygen Delivery Method): room air          I&O's Summary    09 Jun 2025 07:01  -  10 Brian 2025 07:00  --------------------------------------------------------  IN: 100 mL / OUT: 0 mL / NET: 100 mL      --------------------------------------------------------------------------------------      GENERAL: NAD, lying in bed comfortably  CHEST/LUNG: Unlabored respirations  HEART: Regular rate and rhythm  ABDOMEN: Soft, TTP in LLQ, nondistended  EXTREMITIES:  No clubbing, cyanosis, or edema b/l calf pain   NERVOUS SYSTEM:  A&Ox3    LABS  --------------------------------------------------------------------------------------  Labs:  CAPILLARY BLOOD GLUCOSE      POCT Blood Glucose.: 122 mg/dL (10 Brian 2025 01:44)  POCT Blood Glucose.: 155 mg/dL (09 Jun 2025 18:56)                          7.2    12.87 )-----------( 479      ( 10 Brian 2025 06:23 )             23.8       Auto Immature Granulocyte %: 0.6 % (06-09-25 @ 21:39)    06-10    133[L]  |  98  |  15  ----------------------------<  128[H]  5.1   |  22  |  1.24      Calcium: 8.6 mg/dL (06-10-25 @ 06:23)      LFTs:             7.4  | 0.4  | 22       ------------------[105     ( 09 Jun 2025 21:39 )  2.9  | x    | 16          Lipase:36     Amylase:x         Lactate, Blood: 0.7 mmol/L (06-10-25 @ 02:20)  Lactate, Blood: 2.3 mmol/L (06-09-25 @ 22:55)  Blood Gas Venous - Lactate: 2.8 mmol/L (06-09-25 @ 22:55)      Coags:     11.7   ----< 1.01    ( 09 Jun 2025 21:39 )     25.1                Urinalysis Basic - ( 10 Brian 2025 06:23 )    Color: x / Appearance: x / SG: x / pH: x  Gluc: 128 mg/dL / Ketone: x  / Bili: x / Urobili: x   Blood: x / Protein: x / Nitrite: x   Leuk Esterase: x / RBC: x / WBC x   Sq Epi: x / Non Sq Epi: x / Bacteria: x        Urinalysis with Rflx Culture (collected 09 Jun 2025 23:17)        --------------------------------------------------------------------------------------    OTHER LAB 89F PMH DM, CAD, HTN, HLD, and recently diagnosed Colon Ca. Patient presenting with sporadic fevers at home for 1 week and weakness. Patient was recently diagnosed with colon cancer at [facility] where she was admitted for a lower GI bleed and low Hg of 6.5, per son, patient had had blood in the stool for several months before admission. At [facility] patient had serval blood transfusions and had a CT scan showing  mural thickening and adjacent stranding of proximal sigmoid colon, suspicious of mural neoplasm, following colonoscopy showed  melanosis in colon,  fungating infiltrative polypoid and ulcerated partially obstructing large mass was near to sigmoid colon (per chart reviwe) Pathology not available on chart review; patient and family were instructed to f/u with colorectal surgery but have not seen anyone yet. Patient had never had a colonoscopy before this. TTE at OSH EF of 60%.     Seen and examined: Fever 100.4, HDS, Denies pain, TTP LLQ, WBC 14.5, lactate 2.3 improved to 0.7 after 1L of IVF. CT: Complex multiloculated rim-enhancing collection measuring up to 8.1 cm within the left lower retroperitoneum abuts a thickened segment of sigmoid colon, suspicious for perforated diverticulitis vs  colonic malignancy with perforation, additionally left common femoral vein is suspicious for DVT 2/2 central tubular filling defect on CT 6/10 imaging.  Vascular surgery consulted for findings above for evaluation and possible treatment options       PAST MEDICAL & SURGICAL HISTORY:  Hypertension      CAD (coronary artery disease)        Home Meds: Home Medications:    Allergies: Allergies    No Known Allergies    Intolerances      Soc:   Advanced Directives: Presumed Full Code     ROS:    REVIEW OF SYSTEMS    [x] A ten-point review of systems was otherwise negative except as noted.    CURRENT MEDICATIONS:   --------------------------------------------------------------------------------------  Neurologic Medications  acetaminophen   IVPB .. 1000 milliGRAM(s) IV Intermittent every 6 hours    Respiratory Medications    Cardiovascular Medications    Gastrointestinal Medications  lactated ringers. 1000 milliLiter(s) IV Continuous <Continuous>    Genitourinary Medications    Hematologic/Oncologic Medications    Antimicrobial/Immunologic Medications  piperacillin/tazobactam IVPB.. 3.375 Gram(s) IV Intermittent every 8 hours    Endocrine/Metabolic Medications    Topical/Other Medications    --------------------------------------------------------------------------------------    VITAL SIGNS, INS/OUTS (last 24 hours):  --------------------------------------------------------------------------------------  ICU Vital Signs Last 24 Hrs  T(C): 37 (10 Brian 2025 05:32), Max: 38 (09 Jun 2025 22:50)  T(F): 98.6 (10 Brian 2025 05:32), Max: 100.4 (09 Jun 2025 22:50)  HR: 86 (10 Brian 2025 05:32) (86 - 92)  BP: 128/55 (10 Brian 2025 05:32) (108/60 - 128/55)  BP(mean): 64 (10 Brian 2025 02:06) (64 - 65)  ABP: --  ABP(mean): --  RR: 16 (10 Brian 2025 05:32) (16 - 20)  SpO2: 96% (10 Brian 2025 05:32) (95% - 100%)    O2 Parameters below as of 10 Brian 2025 05:32  Patient On (Oxygen Delivery Method): room air          I&O's Summary    09 Jun 2025 07:01  -  10 Brian 2025 07:00  --------------------------------------------------------  IN: 100 mL / OUT: 0 mL / NET: 100 mL      --------------------------------------------------------------------------------------      GENERAL: NAD, lying in bed comfortably  CHEST/LUNG: Unlabored respirations  HEART: Regular rate and rhythm  ABDOMEN: Soft, TTP in LLQ, nondistended  EXTREMITIES:  No clubbing, cyanosis, or edema b/l calf pain   NERVOUS SYSTEM:  A&Ox3    LABS  --------------------------------------------------------------------------------------  Labs:  CAPILLARY BLOOD GLUCOSE      POCT Blood Glucose.: 122 mg/dL (10 Brian 2025 01:44)  POCT Blood Glucose.: 155 mg/dL (09 Jun 2025 18:56)                          7.2    12.87 )-----------( 479      ( 10 Brian 2025 06:23 )             23.8       Auto Immature Granulocyte %: 0.6 % (06-09-25 @ 21:39)    06-10    133[L]  |  98  |  15  ----------------------------<  128[H]  5.1   |  22  |  1.24      Calcium: 8.6 mg/dL (06-10-25 @ 06:23)      LFTs:             7.4  | 0.4  | 22       ------------------[105     ( 09 Jun 2025 21:39 )  2.9  | x    | 16          Lipase:36     Amylase:x         Lactate, Blood: 0.7 mmol/L (06-10-25 @ 02:20)  Lactate, Blood: 2.3 mmol/L (06-09-25 @ 22:55)  Blood Gas Venous - Lactate: 2.8 mmol/L (06-09-25 @ 22:55)      Coags:     11.7   ----< 1.01    ( 09 Jun 2025 21:39 )     25.1                Urinalysis Basic - ( 10 Brian 2025 06:23 )    Color: x / Appearance: x / SG: x / pH: x  Gluc: 128 mg/dL / Ketone: x  / Bili: x / Urobili: x   Blood: x / Protein: x / Nitrite: x   Leuk Esterase: x / RBC: x / WBC x   Sq Epi: x / Non Sq Epi: x / Bacteria: x        Urinalysis with Rflx Culture (collected 09 Jun 2025 23:17)        --------------------------------------------------------------------------------------    OTHER LAB

## 2025-06-10 NOTE — CONSULT NOTE ADULT - CONSULT REASON
Left CVF filling defect, highly suspicious for LLE DVT Left CVF filling defect, highly suspicious for LLE DVT    per CTA/P 6/9/25+Central tubular filling defect within the left common femoral vein

## 2025-06-11 ENCOUNTER — RESULT REVIEW (OUTPATIENT)
Age: 89
End: 2025-06-11

## 2025-06-11 LAB
ANION GAP SERPL CALC-SCNC: 15 MMOL/L — HIGH (ref 7–14)
APTT BLD: 26.5 SEC — SIGNIFICANT CHANGE UP (ref 26.1–36.8)
BUN SERPL-MCNC: 15 MG/DL — SIGNIFICANT CHANGE UP (ref 7–23)
CALCIUM SERPL-MCNC: 8.2 MG/DL — LOW (ref 8.4–10.5)
CHLORIDE SERPL-SCNC: 102 MMOL/L — SIGNIFICANT CHANGE UP (ref 98–107)
CO2 SERPL-SCNC: 22 MMOL/L — SIGNIFICANT CHANGE UP (ref 22–31)
CREAT SERPL-MCNC: 1.27 MG/DL — SIGNIFICANT CHANGE UP (ref 0.5–1.3)
CULTURE RESULTS: NO GROWTH — SIGNIFICANT CHANGE UP
EGFR: 40 ML/MIN/1.73M2 — LOW
EGFR: 40 ML/MIN/1.73M2 — LOW
GLUCOSE SERPL-MCNC: 223 MG/DL — HIGH (ref 70–99)
HCT VFR BLD CALC: 27.4 % — LOW (ref 34.5–45)
HGB BLD-MCNC: 8.3 G/DL — LOW (ref 11.5–15.5)
INR BLD: 1.24 RATIO — HIGH (ref 0.85–1.16)
MAGNESIUM SERPL-MCNC: 2.3 MG/DL — SIGNIFICANT CHANGE UP (ref 1.6–2.6)
MCHC RBC-ENTMCNC: 21.2 PG — LOW (ref 27–34)
MCHC RBC-ENTMCNC: 30.3 G/DL — LOW (ref 32–36)
MCV RBC AUTO: 69.9 FL — LOW (ref 80–100)
NRBC # BLD AUTO: 0 K/UL — SIGNIFICANT CHANGE UP (ref 0–0)
NRBC # FLD: 0 K/UL — SIGNIFICANT CHANGE UP (ref 0–0)
NRBC BLD AUTO-RTO: 0 /100 WBCS — SIGNIFICANT CHANGE UP (ref 0–0)
PHOSPHATE SERPL-MCNC: 5.4 MG/DL — HIGH (ref 2.5–4.5)
PLATELET # BLD AUTO: 507 K/UL — HIGH (ref 150–400)
POTASSIUM SERPL-MCNC: 4.7 MMOL/L — SIGNIFICANT CHANGE UP (ref 3.5–5.3)
POTASSIUM SERPL-SCNC: 4.7 MMOL/L — SIGNIFICANT CHANGE UP (ref 3.5–5.3)
PROTHROM AB SERPL-ACNC: 14.4 SEC — HIGH (ref 9.9–13.4)
RBC # BLD: 3.92 M/UL — SIGNIFICANT CHANGE UP (ref 3.8–5.2)
RBC # FLD: 21.9 % — HIGH (ref 10.3–14.5)
SODIUM SERPL-SCNC: 139 MMOL/L — SIGNIFICANT CHANGE UP (ref 135–145)
SPECIMEN SOURCE: SIGNIFICANT CHANGE UP
WBC # BLD: 15.76 K/UL — HIGH (ref 3.8–10.5)
WBC # FLD AUTO: 15.76 K/UL — HIGH (ref 3.8–10.5)

## 2025-06-11 PROCEDURE — 99233 SBSQ HOSP IP/OBS HIGH 50: CPT

## 2025-06-11 PROCEDURE — 37191 INS ENDOVAS VENA CAVA FILTR: CPT

## 2025-06-11 PROCEDURE — 93306 TTE W/DOPPLER COMPLETE: CPT | Mod: 26

## 2025-06-11 RX ORDER — POTASSIUM CHLORIDE, DEXTROSE MONOHYDRATE AND SODIUM CHLORIDE 150; 5; 900 MG/100ML; G/100ML; MG/100ML
1000 INJECTION, SOLUTION INTRAVENOUS
Refills: 0 | Status: DISCONTINUED | OUTPATIENT
Start: 2025-06-11 | End: 2025-06-12

## 2025-06-11 RX ORDER — MELATONIN 5 MG
6 TABLET ORAL AT BEDTIME
Refills: 0 | Status: DISCONTINUED | OUTPATIENT
Start: 2025-06-11 | End: 2025-06-20

## 2025-06-11 RX ADMIN — Medication 25 GRAM(S): at 21:00

## 2025-06-11 RX ADMIN — Medication 6 MILLIGRAM(S): at 22:27

## 2025-06-11 RX ADMIN — Medication 25 GRAM(S): at 10:55

## 2025-06-11 RX ADMIN — POTASSIUM CHLORIDE, DEXTROSE MONOHYDRATE AND SODIUM CHLORIDE 100 MILLILITER(S): 150; 5; 900 INJECTION, SOLUTION INTRAVENOUS at 09:40

## 2025-06-11 RX ADMIN — Medication 25 GRAM(S): at 03:03

## 2025-06-11 NOTE — PROGRESS NOTE ADULT - SUBJECTIVE AND OBJECTIVE BOX
TEAM [ C ] Surgery Daily Progress Note  =====================================================    INTERVAL:   - IR consulted for drainage of RP abscess and IVC filter placement    SUBJECTIVE: Patient seen and examined at bedside on AM rounds. Patient reports that her last bowel movement was yesterday afternoon and says it was nonbloody.     ALLERGIES:  No Known Allergies    -------------------------------------------------------------------------------------    MEDICATIONS:  dextrose 5% + sodium chloride 0.45% with potassium chloride 20 mEq/L 1000 milliLiter(s) IV Continuous <Continuous>  piperacillin/tazobactam IVPB.. 3.375 Gram(s) IV Intermittent every 8 hours    --------------------------------------------------------------------------------------    VITAL SIGNS:  T(C): 36.4 (06-11-25 @ 12:56), Max: 36.7 (06-11-25 @ 09:57)  HR: 87 (06-11-25 @ 12:56) (80 - 87)  BP: 144/63 (06-11-25 @ 12:56) (139/55 - 148/66)  RR: 17 (06-11-25 @ 12:56) (17 - 18)  SpO2: 96% (06-11-25 @ 12:56) (95% - 98%)  --------------------------------------------------------------------------------------    INS AND OUTS:    06-10-25 @ 07:01  -  06-11-25 @ 07:00  --------------------------------------------------------  IN: 3200 mL / OUT: 2800 mL / NET: 400 mL    06-11-25 @ 07:01  -  06-11-25 @ 13:14  --------------------------------------------------------  IN: 500 mL / OUT: 300 mL / NET: 200 mL      --------------------------------------------------------------------------------------      EXAM  GENERAL: NAD, lying in bed comfortably  CHEST/LUNG: Unlabored respirations  CARDS: [facility]  ABDOMEN: Soft, TTP in LLQ, nondistended  EXTREMITIES:  No clubbing, cyanosis, or edema b/l calf pain. Bilateral feet are warm. Palpable DP in bilateral feet. No signs of phlegmasia.   NERVOUS SYSTEM:  A&Ox3    --------------------------------------------------------------------------------------    LABS      LABS:  cret                        8.3    15.76 )-----------( 507      ( 11 Jun 2025 05:34 )             27.4     06-11    139  |  102  |  15  ----------------------------<  223[H]  4.7   |  22  |  1.27    Ca    8.2[L]      11 Jun 2025 05:34  Phos  5.4     06-11  Mg     2.30     06-11    TPro  7.4  /  Alb  2.9[L]  /  TBili  0.4  /  DBili  x   /  AST  22  /  ALT  16  /  AlkPhos  105  06-09    PT/INR - ( 11 Jun 2025 05:34 )   PT: 14.4 sec;   INR: 1.24 ratio         PTT - ( 11 Jun 2025 05:34 )  PTT:26.5 sec

## 2025-06-11 NOTE — PROGRESS NOTE ADULT - ASSESSMENT
89F PMH DM, CAD, HTN, HLD, and recently sigmoid colon ca c/b lower GI bleeding. While inpatient she was found to have an acute, nonocclusive DVT of the L common femoral vein near the saphenofemoral junction on CT and LE duplex. Vascular surgery consulted for additional recommendations.     Recommendations:  - Pt is a candidate for IVC filter placement given inability to tolerate a/c i/s/o recent lower GI bleeding  - IR consult --> plan to place IVC filter today  - Remainder of care per primary team    C-Team  [id number]

## 2025-06-11 NOTE — PRE PROCEDURE NOTE - PRE PROCEDURE EVALUATION
Interventional Radiology    HPI:  89F PMH DM, CAD, HTN, HLD, and recently diagnosed Colon Ca. Patient presenting with sporadic fevers at home for 1 week and weakness. Patient was recently diagnosed with colon cancer at [facility] where she was admitted for a lower GI bleed and low Hg of 6.5, per son, patient had had blood in the stool for several months before admission. At [facility] patient had serval blood transfusions and had a CT scan showing  mural thickening and adjacent stranding of proximal sigmoid colon, suspicious of mural neoplasm, following colonoscopy showed  melanosis in colon,  fungating infiltrative polypoid and ulcerated partially obstructing large mass was near to sigmoid colon (per chart reviwe) . Incidental left common femoral vein filling defect noted on CTAP imaging on this admission (6/10/25)concerning for DVT. IR consulted for IVC filter.    Review of Systems:   Constitutional: NCAT, well-appearing   Respiratory: No shortness of breath  Cardiovascular: No chest pain  Gastrointestinal: No abdominal or epigastric pain. No nausea or vomiting.    Allergies: No Known Allergies    Medications (Abx/Cardiac/Anticoagulation/Blood Products)  cefTRIAXone   IVPB: 100 mL/Hr IV Intermittent (06-10 @ 01:43)  piperacillin/tazobactam IVPB..: 25 mL/Hr IV Intermittent (06-11 @ 10:55)  piperacillin/tazobactam IVPB...: 200 mL/Hr IV Intermittent (06-10 @ 03:49)    Data:  157.5  66.5  T(C): 36.4  HR: 87  BP: 144/63  RR: 17  SpO2: 96%    -WBC 15.76 / HgB 8.3 / Hct 27.4 / Plt 507  -Na 139 / Cl 102 / BUN 15 / Glucose 223  -K 4.7 / CO2 22 / Cr 1.27  -ALT -- / Alk Phos -- / T.Bili --  -INR1.24      Physical Exam  General: No acute distress, nontoxic, A&Ox3  Chest: Non labored breathing  Skin: No rashes or lesions    RADIOLOGY & ADDITIONAL TESTS:    Imaging Reviewed    H & P Note Reviewed from: 6/10/25    Plan: 89y Female presents for IVC filter placement   -Risks/Benefits/alternatives explained with the patient and/or healthcare proxy and witnessed informed consent obtained.

## 2025-06-11 NOTE — PROGRESS NOTE ADULT - ASSESSMENT
89F PMH DM, CAD, HTN, HLD, and recently diagnosed Colon Ca. Patient presenting with sporadic fevers at home for 1 week and weakness. Patient was recently diagnosed with colon cancer at [facility] where she was admitted for a lower GI bleed and low Hg of 6.5, per son, patient had had blood in the stool for several months before admission. At [facility] patient had serval blood transfusions and had a CT scan showing  mural thickening and adjacent stranding of proximal sigmoid colon, suspicious of mural neoplasm, following colonoscopy showed  melanosis in colon,  fungating infiltrative polypoid and ulcerated partially obstructing large mass was near to sigmoid colon (per chart reviwe) . Incidental left common femoral vein filling defect noted on CTAP imaging on this admission (6/10/25) . Vascular surgery consulted for evaluation for possible left lower extremity DVT     Plan :    NPO  CARDS/vascular/IR/CRS  89F PMH DM, CAD, HTN, HLD, and recently diagnosed Colon Ca. Patient presenting with sporadic fevers at home for 1 week and weakness. Patient was recently diagnosed with colon cancer at [facility] where she was admitted for a lower GI bleed and low Hg of 6.5, per son, patient had had blood in the stool for several months before admission. At [facility] patient had serval blood transfusions and had a CT scan showing  mural thickening and adjacent stranding of proximal sigmoid colon, suspicious of mural neoplasm, following colonoscopy showed  melanosis in colon,  fungating infiltrative polypoid and ulcerated partially obstructing large mass was near to sigmoid colon (per chart reviwe) . Incidental left common femoral vein filling defect noted on CTAP imaging on this admission (6/10/25) . Vascular surgery consulted for evaluation for possible left lower extremity DVT     Plan :    NPO  - IV Abx: zosyn 6/10/25  - Pain control PRN  - Diet: NPO except meds  - IVF d5 1/2 kcl 20meq  - Activity: OOB  - DVT ppx: SCD's  Consultants on board as of 6/11/25  - CARDS consulted for risk stratification , pending TTE  - Vascular for LLE DVT    - IR for RP abscess /?IVC Filter candidate , for RP abscess drainage today  - consider WOCN consult in AM 6/12 for ostomy marking pending family discussion       89F PMH DM, CAD, HTN, HLD, and recently diagnosed Colon Ca. Patient presenting with sporadic fevers at home for 1 week and weakness. Patient was recently diagnosed with colon cancer at [facility] where she was admitted for a lower GI bleed and low Hg of 6.5, per son, patient had had blood in the stool for several months before admission. At [facility] patient had serval blood transfusions and had a CT scan showing  mural thickening and adjacent stranding of proximal sigmoid colon, suspicious of mural neoplasm, following colonoscopy showed  melanosis in colon,  fungating infiltrative polypoid and ulcerated partially obstructing large mass was near to sigmoid colon (per chart reviwe) . Incidental left common femoral vein filling defect noted on CTAP imaging on this admission (6/10/25)concerning for DVT.    Plan :    NPO  - IV Abx: zosyn 6/10/25  - Pain control PRN  - Diet: NPO except meds  - IVF d5 1/2 kcl 20meq  - Activity: OOB  - DVT ppx: SCD's  Consultants on board as of 6/11/25  - CARDS consulted for risk stratification , pending TTE  - Vascular for LLE DVT    - IR for RP abscess /?IVC Filter candidate , for RP abscess drainage today  - consider WOCN consult in AM 6/12 for ostomy marking pending family discussion       89F PMH DM, CAD, HTN, HLD, and recently diagnosed Colon Ca. Patient presenting with sporadic fevers at home for 1 week and weakness. Patient was recently diagnosed with colon cancer at [facility] where she was admitted for a lower GI bleed and low Hg of 6.5, per son, patient had had blood in the stool for several months before admission. At [facility] patient had serval blood transfusions and had a CT scan showing  mural thickening and adjacent stranding of proximal sigmoid colon, suspicious of mural neoplasm, following colonoscopy showed  melanosis in colon,  fungating infiltrative polypoid and ulcerated partially obstructing large mass was near to sigmoid colon (per chart reviwe) . Incidental left common femoral vein filling defect noted on CTAP imaging on this admission (6/10/25)concerning for DVT.    Plan :    NPO  - IV Abx: zosyn 6/10/25  - Pain control PRN  - Diet: NPO except meds  - IVF d5 1/2 kcl 20meq  - Activity: OOB  - DVT ppx: SCD's  Consultants on board as of 6/11/25  - CARDS consulted for risk stratification , pending TTE  - Vascular for LLE DVT    - IR for RP abscess /IVC Filter , d/w IR patient added on for IVC filter today PM 6/11/25 and subsequent RP drainage later on this admission.   - consider WOCN consult in AM 6/12 for ostomy marking pending family discussion

## 2025-06-11 NOTE — CHART NOTE - NSCHARTNOTEFT_GEN_A_CORE
Post Operative Note  Patient: GAYLE SPANN 89y (20-Mar-1932) Female   MRN: [id number]  [facility]  Visit: 06-10-25 Inpatient  Date: 06-11-25 @ 22:13    Procedure: S/P Insertion of IVC filter via the right basilic vein    Subjective: Patient seen and examined post operatively. Feels comfortable, no arm pain/ swelling      Objective:  Vitals: T(F): 97.9 (06-11-25 @ 20:40), Max: 98 (06-11-25 @ 09:57)  HR: 77 (06-11-25 @ 20:40)  BP: 149/69 (06-11-25 @ 20:40) (139/76 - 149/69)  RR: 18 (06-11-25 @ 20:40)  SpO2: 97% (06-11-25 @ 20:40)    Vent Settings:     Is & Os:  06-10-25 @ 07:01  -  06-11-25 @ 07:00  --------------------------------------------------------  IN: 3200 mL / OUT: 2800 mL / NET: 400 mL    06-11-25 @ 07:01  -  06-11-25 @ 22:13  --------------------------------------------------------  IN: 1100 mL / OUT: 750 mL / NET: 350 mL        Physical Examination:  General: NAD, resting comfortably in bed  Local exam - no swelling/ strikethrough    Imaging:  No post-op imaging studies    Assessment:  89yFemale patient S/P Insertion of IVC filter via the right basilic vein    Plan:  - Pain: melatonin PRN  - Diet: can have diet till midnight and then NPO  - DVT ppx: SCD's &     Date/Time: 06-11-25 @ 22:13

## 2025-06-11 NOTE — CHART NOTE - NSCHARTNOTEFT_GEN_A_CORE
PRE-INTERVENTIONAL RADIOLOGY PROCEDURE NOTE      Patient Age: 89    Patient Gender: F     Procedure:  IVC Filter placement    Diagnosis/Indication:  Left lower extremity DVT , above knee    Interventional Radiology Attending Physician: Jacob BALDWIN    Ordering Attending Physician: Rosemary BALDWIN    Pertinent Medical History:    Pertinent labs:    LABS                                              8.3                   Neurophils% (auto):   x      (06-11 @ 05:34):    15.76)-----------(507          Lymphocytes% (auto):  x                                             27.4                   Eosinphils% (auto):   x        Manual%: Neutrophils x    ; Lymphocytes x    ; Eosinophils x    ; Bands%: x    ; Blasts x                                    139    |  102    |  15                  Calcium: 8.2   / iCa: x      (06-11 @ 05:34)    ----------------------------<  223       Magnesium: 2.30                             4.7     |  22     |  1.27             Phosphorous: 5.4        ( 06-11 @ 05:34 )   PT: 14.4 sec;   INR: 1.24 ratio  aPTT: 26.5 sec    LIVER FUNCTIONS - ( 09 Jun 2025 21:39 )  Alb: 2.9 g/dL / Pro: 7.4 g/dL / ALK PHOS: 105 U/L / ALT: 16 U/L / AST: 22 U/L / GGT: x           Urinalysis Basic - ( 11 Jun 2025 05:34 )    Color: x / Appearance: x / SG: x / pH: x  Gluc: 223 mg/dL / Ketone: x  / Bili: x / Urobili: x   Blood: x / Protein: x / Nitrite: x   Leuk Esterase: x / RBC: x / WBC x   Sq Epi: x / Non Sq Epi: x / Bacteria: x      ABO Interpretation: B (10 Brian 2025 10:38)  ABO Interpretation: B (10 Brian 2025 06:00)       PTT - ( 09 Jun 2025 21:39 )  PTT:25.1 sec        Patient and Family Aware ? Yes PRE-INTERVENTIONAL RADIOLOGY PROCEDURE NOTE      Patient Age: 89    Patient Gender: F     Procedure:  IVC Filter placement    Diagnosis/Indication:  Left lower extremity DVT , above knee    "LEFT:  Noncompressible common femoral vein at the saphenofemoral junction. The   lumen of the common femoral vein is filled with hyperechoic material.   There is venous flow within the more proximal aspect of the common   femoral vein.  Normal compressibility of the femoral and popliteal veins.  No LEFT calf vein thrombosis is detected.    IMPRESSION:  Acute deep venous thrombosis: above the knee.  "    Interventional Radiology Attending Physician: Jacob BALDWIN    Ordering Attending Physician: Rosemary BALDWIN    Pertinent Medical History:    Pertinent labs:    LABS                                              8.3                   Neurophils% (auto):   x      (06-11 @ 05:34):    15.76)-----------(507          Lymphocytes% (auto):  x                                             27.4                   Eosinphils% (auto):   x        Manual%: Neutrophils x    ; Lymphocytes x    ; Eosinophils x    ; Bands%: x    ; Blasts x                                    139    |  102    |  15                  Calcium: 8.2   / iCa: x      (06-11 @ 05:34)    ----------------------------<  223       Magnesium: 2.30                             4.7     |  22     |  1.27             Phosphorous: 5.4        ( 06-11 @ 05:34 )   PT: 14.4 sec;   INR: 1.24 ratio  aPTT: 26.5 sec    LIVER FUNCTIONS - ( 09 Jun 2025 21:39 )  Alb: 2.9 g/dL / Pro: 7.4 g/dL / ALK PHOS: 105 U/L / ALT: 16 U/L / AST: 22 U/L / GGT: x           Urinalysis Basic - ( 11 Jun 2025 05:34 )    Color: x / Appearance: x / SG: x / pH: x  Gluc: 223 mg/dL / Ketone: x  / Bili: x / Urobili: x   Blood: x / Protein: x / Nitrite: x   Leuk Esterase: x / RBC: x / WBC x   Sq Epi: x / Non Sq Epi: x / Bacteria: x      ABO Interpretation: B (10 Brian 2025 10:38)  ABO Interpretation: B (10 Brian 2025 06:00)       PTT - ( 09 Jun 2025 21:39 )  PTT:25.1 sec        Patient and Family Aware ? Yes

## 2025-06-11 NOTE — PROGRESS NOTE ADULT - SUBJECTIVE AND OBJECTIVE BOX
Surgery Daily Progress Note  =====================================================      Interval Events :   s/p 1U PRBC  s/p US duplex LE  Respectively vascular/IR /cardiology consulted for LLE dvt/ RP abscess/?ivc filter eval and risk stratification for possible OR planning re: colon mass       SUBJECTIVE: Patient seen and examined at bedside on AM rounds. Son at bedside to facilitate communication. Patient reports that they're feeling well. Denies fever, chills, N/V, chest pain, SOB    ALLERGIES:  No Known Allergies    -------------------------------------------------------------------------------------    MEDICATIONS:  dextrose 5% + sodium chloride 0.45% with potassium chloride 20 mEq/L 1000 milliLiter(s) IV Continuous <Continuous>  piperacillin/tazobactam IVPB.. 3.375 Gram(s) IV Intermittent every 8 hours      HOME MEDS :  Home Medications:        --------------------------------------------------------------------------------------    VITAL SIGNS:  T(C): 36.3 (06-11-25 @ 01:06), Max: 36.8 (06-10-25 @ 08:50)  HR: 80 (06-11-25 @ 01:06) (80 - 86)  BP: 142/58 (06-11-25 @ 01:06) (105/52 - 142/58)  RR: 18 (06-11-25 @ 01:06) (17 - 18)  SpO2: 98% (06-11-25 @ 01:06) (95% - 98%)  --------------------------------------------------------------------------------------    INS AND OUTS:    I&O's Detail    09 Jun 2025 07:01  -  10 Brian 2025 07:00  --------------------------------------------------------  IN:    Lactated Ringers: 100 mL  Total IN: 100 mL    OUT:    Oral Fluid: 0 mL    Voided (mL): 0 mL  Total OUT: 0 mL    Total NET: 100 mL      10 Brian 2025 07:01  -  11 Jun 2025 06:14  --------------------------------------------------------  IN:    IV PiggyBack: 400 mL    Lactated Ringers: 1100 mL    PRBCs (Packed Red Blood Cells): 300 mL  Total IN: 1800 mL    OUT:    Oral Fluid: 0 mL    Voided (mL): 1650 mL  Total OUT: 1650 mL    Total NET: 150 mL        --------------------------------------------------------------------------------------    GENERAL: NAD, lying in bed comfortably  CHEST/LUNG: Unlabored respirations  HEART: Regular rate and rhythm  ABDOMEN: Soft, TTP in LLQ, nondistended  EXTREMITIES:  No clubbing, cyanosis, or edema b/l calf pain   NERVOUS SYSTEM:  A&Ox3    Surgery Daily Progress Note  =====================================================      Interval Events :   s/p 1U PRBC  s/p US duplex LE  Respectively vascular/IR /cardiology consulted for LLE dvt/ RP abscess/?ivc filter eval and risk stratification for possible OR planning re: colon mass       SUBJECTIVE: Patient seen and examined at bedside on AM rounds. Son at bedside to facilitate communication. Patient reports that they're feeling well. Denies fever, chills, N/V, chest pain, SOB    ALLERGIES:  No Known Allergies    -------------------------------------------------------------------------------------    MEDICATIONS:  dextrose 5% + sodium chloride 0.45% with potassium chloride 20 mEq/L 1000 milliLiter(s) IV Continuous <Continuous>  piperacillin/tazobactam IVPB.. 3.375 Gram(s) IV Intermittent every 8 hours      HOME MEDS :  Home Medications:        --------------------------------------------------------------------------------------    VITAL SIGNS:  T(C): 36.3 (06-11-25 @ 01:06), Max: 36.8 (06-10-25 @ 08:50)  HR: 80 (06-11-25 @ 01:06) (80 - 86)  BP: 142/58 (06-11-25 @ 01:06) (105/52 - 142/58)  RR: 18 (06-11-25 @ 01:06) (17 - 18)  SpO2: 98% (06-11-25 @ 01:06) (95% - 98%)  --------------------------------------------------------------------------------------    INS AND OUTS:    I&O's Detail    09 Jun 2025 07:01  -  10 Brian 2025 07:00  --------------------------------------------------------  IN:    Lactated Ringers: 100 mL  Total IN: 100 mL    OUT:    Oral Fluid: 0 mL    Voided (mL): 0 mL  Total OUT: 0 mL    Total NET: 100 mL      10 Brian 2025 07:01  -  11 Jun 2025 06:14  --------------------------------------------------------  IN:    IV PiggyBack: 400 mL    Lactated Ringers: 1100 mL    PRBCs (Packed Red Blood Cells): 300 mL  Total IN: 1800 mL    OUT:    Oral Fluid: 0 mL    Voided (mL): 1650 mL  Total OUT: 1650 mL    Total NET: 150 mL        --------------------------------------------------------------------------------------    GENERAL: NAD, lying in bed comfortably  CHEST/LUNG: Unlabored respirations  HEART: Regular rate and rhythm  ABDOMEN: Soft, TTP in LLQ, nondistended  EXTREMITIES:  No clubbing, cyanosis, or edema b/l calf pain   NERVOUS SYSTEM:  A&Ox3    LABS                                              8.3                   Neurophils% (auto):   x      (06-11 @ 05:34):    15.76)-----------(507          Lymphocytes% (auto):  x                                             27.4                   Eosinphils% (auto):   x        Manual%: Neutrophils x    ; Lymphocytes x    ; Eosinophils x    ; Bands%: x    ; Blasts x                                    139    |  102    |  15                  Calcium: 8.2   / iCa: x      (06-11 @ 05:34)    ----------------------------<  223       Magnesium: 2.30                             4.7     |  22     |  1.27             Phosphorous: 5.4        ( 06-11 @ 05:34 )   PT: 14.4 sec;   INR: 1.24 ratio  aPTT: 26.5 sec    LIVER FUNCTIONS - ( 09 Jun 2025 21:39 )  Alb: 2.9 g/dL / Pro: 7.4 g/dL / ALK PHOS: 105 U/L / ALT: 16 U/L / AST: 22 U/L / GGT: x           Urinalysis Basic - ( 11 Jun 2025 05:34 )    Color: x / Appearance: x / SG: x / pH: x  Gluc: 223 mg/dL / Ketone: x  / Bili: x / Urobili: x   Blood: x / Protein: x / Nitrite: x   Leuk Esterase: x / RBC: x / WBC x   Sq Epi: x / Non Sq Epi: x / Bacteria: x      ABO Interpretation: B (10 Brian 2025 10:38)  ABO Interpretation: B (10 Brian 2025 06:00)

## 2025-06-11 NOTE — CONSULT NOTE ADULT - ASSESSMENT
Interventional Radiology    Evaluate for Procedure:     HPI: 89F PMH DM, CAD, HTN, HLD, and recently diagnosed Colon Ca. Patient presenting with sporadic fevers at home for 1 week and weakness. Patient was recently diagnosed with colon cancer at [facility] where she was admitted for a lower GI bleed and low Hg of 6.5, per son, patient had had blood in the stool for several months before admission. At [facility] patient had serval blood transfusions and had a CT scan showing  mural thickening and adjacent stranding of proximal sigmoid colon, suspicious of mural neoplasm, following colonoscopy showed  melanosis in colon,  fungating infiltrative polypoid and ulcerated partially obstructing large mass was near to sigmoid colon (per chart reviwe) . Incidental left common femoral vein filling defect noted on CTAP imaging on this admission (6/10/25)concerning for DVT. IR consulted for IVC filter.     Allergies: No Known Allergies    Medications (Abx/Cardiac/Anticoagulation/Blood Products)  cefTRIAXone   IVPB: 100 mL/Hr IV Intermittent (06-10 @ 01:43)  piperacillin/tazobactam IVPB..: 25 mL/Hr IV Intermittent (06-11 @ 10:55)  piperacillin/tazobactam IVPB...: 200 mL/Hr IV Intermittent (06-10 @ 03:49)    Data:    T(C): 36.7  HR: 81  BP: 148/66  RR: 17  SpO2: 96%    -WBC 15.76 / HgB 8.3 / Hct 27.4 / Plt 507  -Na 139 / Cl 102 / BUN 15 / Glucose 223  -K 4.7 / CO2 22 / Cr 1.27  -ALT -- / Alk Phos -- / T.Bili --  -INR 1.24 / PTT 26.5      Radiology:     Assessment/Plan:     -- IR will plan to perform IVC filter on 6/11/2025 and abscess drainage on 6/12.   -- NPO at midnight on 6/12 for abscess drainage.   -- hold a.m. anticoagulation on 6/12.  -- please complete IR pre-procedure note  -- please place two separate IR procedure order requests for IVC filter and abscess drain requests order under Dr. James     --  Carter Aleman MD, PGY-1 Resident  Vascular and Interventional Radiology   Available on [employer] Teams    - Non-emergent consults: Place IR consult order in Welcome  - Emergent issues (pager): [facility] [phone]; [facility] [phone]; [phone]  - Scheduling questions: [facility] [phone]; [facility] [phone]  - Clinic/outpatient booking: [facility] [phone]; [facility] [phone]

## 2025-06-12 ENCOUNTER — RESULT REVIEW (OUTPATIENT)
Age: 89
End: 2025-06-12

## 2025-06-12 LAB
ADD ON TEST-SPECIMEN IN LAB: SIGNIFICANT CHANGE UP
ANION GAP SERPL CALC-SCNC: 10 MMOL/L — SIGNIFICANT CHANGE UP (ref 7–14)
APTT BLD: 26 SEC — LOW (ref 26.1–36.8)
BUN SERPL-MCNC: 12 MG/DL — SIGNIFICANT CHANGE UP (ref 7–23)
CALCIUM SERPL-MCNC: 8.5 MG/DL — SIGNIFICANT CHANGE UP (ref 8.4–10.5)
CHLORIDE SERPL-SCNC: 104 MMOL/L — SIGNIFICANT CHANGE UP (ref 98–107)
CK MB BLD-MCNC: 1.8 % — SIGNIFICANT CHANGE UP (ref 0–2.5)
CK MB CFR SERPL CALC: 1.9 NG/ML — SIGNIFICANT CHANGE UP
CK SERPL-CCNC: 103 U/L — SIGNIFICANT CHANGE UP (ref 25–170)
CO2 SERPL-SCNC: 23 MMOL/L — SIGNIFICANT CHANGE UP (ref 22–31)
CREAT SERPL-MCNC: 1.2 MG/DL — SIGNIFICANT CHANGE UP (ref 0.5–1.3)
EGFR: 42 ML/MIN/1.73M2 — LOW
EGFR: 42 ML/MIN/1.73M2 — LOW
GLUCOSE SERPL-MCNC: 181 MG/DL — HIGH (ref 70–99)
HCT VFR BLD CALC: 28.7 % — LOW (ref 34.5–45)
HGB BLD-MCNC: 8.6 G/DL — LOW (ref 11.5–15.5)
INR BLD: 1.07 RATIO — SIGNIFICANT CHANGE UP (ref 0.85–1.16)
MAGNESIUM SERPL-MCNC: 2.2 MG/DL — SIGNIFICANT CHANGE UP (ref 1.6–2.6)
MCHC RBC-ENTMCNC: 21.5 PG — LOW (ref 27–34)
MCHC RBC-ENTMCNC: 30 G/DL — LOW (ref 32–36)
MCV RBC AUTO: 71.8 FL — LOW (ref 80–100)
NRBC # BLD AUTO: 0 K/UL — SIGNIFICANT CHANGE UP (ref 0–0)
NRBC # FLD: 0 K/UL — SIGNIFICANT CHANGE UP (ref 0–0)
NRBC BLD AUTO-RTO: 0 /100 WBCS — SIGNIFICANT CHANGE UP (ref 0–0)
PHOSPHATE SERPL-MCNC: 3.4 MG/DL — SIGNIFICANT CHANGE UP (ref 2.5–4.5)
PLATELET # BLD AUTO: 497 K/UL — HIGH (ref 150–400)
POTASSIUM SERPL-MCNC: 4.4 MMOL/L — SIGNIFICANT CHANGE UP (ref 3.5–5.3)
POTASSIUM SERPL-SCNC: 4.4 MMOL/L — SIGNIFICANT CHANGE UP (ref 3.5–5.3)
PROTHROM AB SERPL-ACNC: 12.7 SEC — SIGNIFICANT CHANGE UP (ref 9.9–13.4)
RBC # BLD: 4 M/UL — SIGNIFICANT CHANGE UP (ref 3.8–5.2)
RBC # FLD: 22.4 % — HIGH (ref 10.3–14.5)
SODIUM SERPL-SCNC: 137 MMOL/L — SIGNIFICANT CHANGE UP (ref 135–145)
TROPONIN T, HIGH SENSITIVITY RESULT: 14 NG/L — SIGNIFICANT CHANGE UP
WBC # BLD: 11.92 K/UL — HIGH (ref 3.8–10.5)
WBC # FLD AUTO: 11.92 K/UL — HIGH (ref 3.8–10.5)

## 2025-06-12 PROCEDURE — 88112 CYTOPATH CELL ENHANCE TECH: CPT | Mod: 26

## 2025-06-12 PROCEDURE — 99232 SBSQ HOSP IP/OBS MODERATE 35: CPT | Mod: GC

## 2025-06-12 PROCEDURE — 99233 SBSQ HOSP IP/OBS HIGH 50: CPT

## 2025-06-12 PROCEDURE — 49406 IMAGE CATH FLUID PERI/RETRO: CPT

## 2025-06-12 PROCEDURE — 88305 TISSUE EXAM BY PATHOLOGIST: CPT | Mod: 26

## 2025-06-12 RX ORDER — GABAPENTIN 400 MG/1
0 CAPSULE ORAL
Qty: 0 | Refills: 0 | DISCHARGE

## 2025-06-12 RX ORDER — SERTRALINE 100 MG/1
25 TABLET, FILM COATED ORAL DAILY
Refills: 0 | Status: DISCONTINUED | OUTPATIENT
Start: 2025-06-12 | End: 2025-06-20

## 2025-06-12 RX ORDER — INSULIN LISPRO 100 U/ML
INJECTION, SOLUTION INTRAVENOUS; SUBCUTANEOUS EVERY 6 HOURS
Refills: 0 | Status: DISCONTINUED | OUTPATIENT
Start: 2025-06-12 | End: 2025-06-12

## 2025-06-12 RX ORDER — ATORVASTATIN CALCIUM 80 MG/1
40 TABLET, FILM COATED ORAL AT BEDTIME
Refills: 0 | Status: DISCONTINUED | OUTPATIENT
Start: 2025-06-12 | End: 2025-06-20

## 2025-06-12 RX ORDER — ENOXAPARIN SODIUM 100 MG/ML
40 INJECTION SUBCUTANEOUS EVERY 24 HOURS
Refills: 0 | Status: DISCONTINUED | OUTPATIENT
Start: 2025-06-12 | End: 2025-06-20

## 2025-06-12 RX ORDER — AMLODIPINE BESYLATE 10 MG/1
5 TABLET ORAL DAILY
Refills: 0 | Status: DISCONTINUED | OUTPATIENT
Start: 2025-06-12 | End: 2025-06-20

## 2025-06-12 RX ORDER — ACETAMINOPHEN 500 MG/5ML
650 LIQUID (ML) ORAL EVERY 6 HOURS
Refills: 0 | Status: DISCONTINUED | OUTPATIENT
Start: 2025-06-12 | End: 2025-06-16

## 2025-06-12 RX ORDER — INSULIN LISPRO 100 U/ML
INJECTION, SOLUTION INTRAVENOUS; SUBCUTANEOUS AT BEDTIME
Refills: 0 | Status: DISCONTINUED | OUTPATIENT
Start: 2025-06-12 | End: 2025-06-13

## 2025-06-12 RX ORDER — INSULIN LISPRO 100 U/ML
INJECTION, SOLUTION INTRAVENOUS; SUBCUTANEOUS
Refills: 0 | Status: DISCONTINUED | OUTPATIENT
Start: 2025-06-12 | End: 2025-06-13

## 2025-06-12 RX ORDER — METOPROLOL SUCCINATE 50 MG/1
25 TABLET, EXTENDED RELEASE ORAL
Refills: 0 | Status: DISCONTINUED | OUTPATIENT
Start: 2025-06-12 | End: 2025-06-20

## 2025-06-12 RX ADMIN — POTASSIUM CHLORIDE, DEXTROSE MONOHYDRATE AND SODIUM CHLORIDE 100 MILLILITER(S): 150; 5; 900 INJECTION, SOLUTION INTRAVENOUS at 08:58

## 2025-06-12 RX ADMIN — Medication 25 GRAM(S): at 18:22

## 2025-06-12 RX ADMIN — Medication 25 GRAM(S): at 03:19

## 2025-06-12 RX ADMIN — Medication 650 MILLIGRAM(S): at 06:49

## 2025-06-12 RX ADMIN — ENOXAPARIN SODIUM 40 MILLIGRAM(S): 100 INJECTION SUBCUTANEOUS at 23:32

## 2025-06-12 RX ADMIN — Medication 650 MILLIGRAM(S): at 05:49

## 2025-06-12 RX ADMIN — Medication 650 MILLIGRAM(S): at 23:33

## 2025-06-12 RX ADMIN — METOPROLOL SUCCINATE 25 MILLIGRAM(S): 50 TABLET, EXTENDED RELEASE ORAL at 18:22

## 2025-06-12 RX ADMIN — Medication 25 GRAM(S): at 11:42

## 2025-06-12 NOTE — CHART NOTE - NSCHARTNOTEFT_GEN_A_CORE
Post Operative Note  Patient: GAYLE SPANN 89y (20-Mar-1932) Female   MRN: [id number]  Location: [facility]  Visit: 06-10-25 Inpatient  Date: 06-12-25 @ 22:20    Procedure: S/P IR guided drain insertion    Subjective: Patient seen and examined post operatively. Reports pain as controlled. Denies nausea, vomiting, fever, chills, chest pain, SOB, cough.      Objective:  Vitals: T(F): 97.8 (06-12-25 @ 18:00), Max: 99.4 (06-12-25 @ 00:51)  HR: 80 (06-12-25 @ 18:00)  BP: 151/75 (06-12-25 @ 18:00) (139/66 - 165/80)  RR: 18 (06-12-25 @ 18:00)  SpO2: 95% (06-12-25 @ 18:00)    Vent Settings:     Is & Os:  06-11-25 @ 07:01  -  06-12-25 @ 07:00  --------------------------------------------------------  IN: 2000 mL / OUT: 1050 mL / NET: 950 mL    06-12-25 @ 07:01  -  06-12-25 @ 22:20  --------------------------------------------------------  IN: 1000 mL / OUT: 400 mL / NET: 600 mL        Physical Examination:  General: NAD, resting comfortably in bed  Abdomen: soft, non tender. Drain in the LLQ     Imaging:  No post-op imaging studies    Assessment:  89yFemale patient S/P IR guided drain insertion    Plan:  - IV Abx: zosyn  - Pain: acetaminophen     Tablet ..; sertraline  - Diet: liquids  - Activity: OOBAT  - DVT ppx: SCD's & enoxaparin Injectable 40 every 24 hours    Date/Time: 06-12-25 @ 22:20

## 2025-06-12 NOTE — PROGRESS NOTE ADULT - SUBJECTIVE AND OBJECTIVE BOX
TEAM [ A ] Surgery Daily Progress Note  =====================================================    SUBJECTIVE: Patient seen and examined at bedside on AM rounds. Patient reports that they're feeling well. Denies fever, chills, N/V, chest pain, SOB    ALLERGIES:  No Known Allergies      --------------------------------------------------------------------------------------    MEDICATIONS:  acetaminophen     Tablet .. 650 milliGRAM(s) Oral every 6 hours  dextrose 5% + sodium chloride 0.45% with potassium chloride 20 mEq/L 1000 milliLiter(s) IV Continuous <Continuous>  melatonin 6 milliGRAM(s) Oral at bedtime PRN  piperacillin/tazobactam IVPB.. 3.375 Gram(s) IV Intermittent every 8 hours    --------------------------------------------------------------------------------------    VITAL SIGNS:  T(C): 37 (06-12-25 @ 04:33), Max: 37.4 (06-12-25 @ 00:51)  HR: 87 (06-12-25 @ 04:33) (77 - 87)  BP: 153/67 (06-12-25 @ 04:33) (143/66 - 153/67)  RR: 18 (06-12-25 @ 04:33) (17 - 18)  SpO2: 95% (06-12-25 @ 04:33) (95% - 97%)  --------------------------------------------------------------------------------------    INS AND OUTS:    06-10-25 @ 07:01  -  06-11-25 @ 07:00  --------------------------------------------------------  IN: 3200 mL / OUT: 2800 mL / NET: 400 mL    06-11-25 @ 07:01  -  06-12-25 @ 05:56  --------------------------------------------------------  IN: 2000 mL / OUT: 750 mL / NET: 1250 mL      --------------------------------------------------------------------------------------      EXAM    General: NAD, resting in bed comfortably.  Cardiac: regular rate, warm and well perfused  Respiratory: Nonlabored respirations, normal cw expansion.  Abdomen: soft, nontender, nondistended  Extremities: normal strength, FROM, no deformities    --------------------------------------------------------------------------------------    LABS       TEAM [ A ] Surgery Daily Progress Note  =====================================================    SUBJECTIVE: Patient seen and examined at bedside on AM rounds. Patient reports that they're feeling well. Denies fever, chills, N/V, chest pain, SOB    ALLERGIES:  No Known Allergies      --------------------------------------------------------------------------------------    MEDICATIONS:  acetaminophen     Tablet .. 650 milliGRAM(s) Oral every 6 hours  dextrose 5% + sodium chloride 0.45% with potassium chloride 20 mEq/L 1000 milliLiter(s) IV Continuous <Continuous>  melatonin 6 milliGRAM(s) Oral at bedtime PRN  piperacillin/tazobactam IVPB.. 3.375 Gram(s) IV Intermittent every 8 hours    --------------------------------------------------------------------------------------    VITAL SIGNS:  T(C): 37 (06-12-25 @ 04:33), Max: 37.4 (06-12-25 @ 00:51)  HR: 87 (06-12-25 @ 04:33) (77 - 87)  BP: 153/67 (06-12-25 @ 04:33) (143/66 - 153/67)  RR: 18 (06-12-25 @ 04:33) (17 - 18)  SpO2: 95% (06-12-25 @ 04:33) (95% - 97%)  --------------------------------------------------------------------------------------    INS AND OUTS:    06-10-25 @ 07:01  -  06-11-25 @ 07:00  --------------------------------------------------------  IN: 3200 mL / OUT: 2800 mL / NET: 400 mL    06-11-25 @ 07:01  -  06-12-25 @ 05:56  --------------------------------------------------------  IN: 2000 mL / OUT: 750 mL / NET: 1250 mL      --------------------------------------------------------------------------------------      EXAM    General: NAD, resting in bed comfortably.  Cardiac: regular rate, warm and well perfused  Respiratory: Nonlabored respirations, normal cw expansion.  Abdomen: soft, nontender, nondistended  Extremities: normal strength, FROM, no deformities    --------------------------------------------------------------------------------------    LABS                        8.6    11.92 )-----------( 497      ( 12 Jun 2025 04:35 )             28.7   06-12    137  |  104  |  12  ----------------------------<  181[H]  4.4   |  23  |  1.20    Ca    8.5      12 Jun 2025 04:35  Phos  3.4     06-12  Mg     2.20     06-12

## 2025-06-12 NOTE — CONSULT NOTE ADULT - ATTENDING COMMENTS
Patient is a 89F with a PMH of DM (not on insulin), CAD s/p stent, HTN, HLD who presents to the ED for large colonic mass.  Admitted to colorectal service, pending tumor resection on 6/16.  Medicine team consulted for medical clearance    Patient speaks primarily [place]ian, son - Yobany - at the bedside to assist in translation  Patient states that prior to hospitalization she was independent for all ADLs, ambulated without assistive devices, able to climb stairs without difficulty.  No active complaints  Of note - son prefers not to let the patient know about cancer.  Prefers to call the cancer a tumor or mass    On exam - mild to moderate systolic murmur  Hard mass palpated in lower mid to L abdomen.  Tenderness to same area    Colon Ca  Colorectal plan for resection of mass on 6/16 - asked for medical clearance    Cardio on as well - had recent TTE     Due to age and comorbidities, patient is a moderate risk for perioperative cardiovascular complications.  Can proceed to resection  Planned for IR drainage of retroperitoneal collection today  Continue IV antibiotics     New L femoral DVT - s/p IVC filter 6/11 as patient cannot tolerate AC.  HTN - continue losartan, amlodipine   DM - monitor FS.  CC diet

## 2025-06-12 NOTE — PROGRESS NOTE ADULT - ASSESSMENT
89F PMH DM, CAD, HTN, HLD, and recently diagnosed Colon Ca. Patient presenting with sporadic fevers at home for 1 week and weakness. Patient was recently diagnosed with colon cancer at [facility] where she was admitted for a lower GI bleed and low Hg of 6.5, per son, patient had had blood in the stool for several months before admission. 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 Incidental left common femoral vein filling defect noted on CTAP imaging on this admission (6/10/25)concerning for DVT.    Plan :  - IV Abx: zosyn 6/10/25  - Pain control PRN  - Diet: NPO except meds  - IVF d5 1/2 kcl 20meq  - Activity: OOB  - DVT ppx: SCD's  Consultants on board as of 6/11/25  - CARDS consulted for risk stratification , pending TTE  - Vascular for LLE DVT    - IR for RP abscess /IVC Filter , d/w IR patient added on for IVC filter today PM 6/11/25 and subsequent RP drainage later on this admission.   - consider WOCN consult in AM 6/12 for ostomy marking pending family discussion     A team surgery [id number]   89F PMH DM, CAD, HTN, HLD, and recently diagnosed Colon Ca. Patient was recently diagnosed with colon cancer at [facility] where she was admitted for a lower GI bleed and low Hg of 6.5, per son, patient had had blood in the stool for several months before admission. 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 Incidental left common femoral vein filling defect noted on CTAP imaging on this admission (6/10/25)concerning for DVT s/p IVC filter 6/11. Plan for surgical resection with ostomy Monday 6/16    Plan :  - IR procedure today for drainage of collection  - IV Abx: zosyn 6/10/25  - Pain control PRN  - Diet: NPO   - IVF d5 1/2 kcl 20meq  - Activity: OOB  - DVT ppx: SCD's  - CARDS consulted for risk stratification  - Vascular for LLE DVT    - Wound care consult for ostomy marking tomorrow Friday  - DVT prophylaxis held for procedure    A team surgery [id number]

## 2025-06-12 NOTE — CONSULT NOTE ADULT - SUBJECTIVE AND OBJECTIVE BOX
MEGAGAYLE TOMAS  89y  Female      Patient is a 89y old  Female who presents with a chief complaint of fungating infiltrative polypoid and ulcerated partially obstructing large mass was near to sigmoid colon (10 Brian 2025 09:21)    HPI:  Admitted and incidentally found to have L femoral DVT w/ IVC filter placed due to prior issue of GIB limiting AC. Pt with significant past medical history HTN, HLD, CAD, DM (not on insulin). No history of obstructive lung disease or smoking history. Good functional status, no history of stroke/TIA. Had cardiac stent placed in 2005.      deferred in favor of son Yobany @ bedside.    PAST MEDICAL/SURGICAL HISTORY  PAST MEDICAL & SURGICAL HISTORY:  Hypertension      CAD (coronary artery disease)          REVIEW OF SYSTEMS:  CONSTITUTIONAL: No fever, weight loss, or fatigue  EYES: No eye pain, visual disturbances, or discharge  ENMT:  No difficulty hearing, tinnitus, vertigo; No sinus or throat pain  NECK: No pain or stiffness  BREASTS: No pain, masses, or nipple discharge  RESPIRATORY: No cough, wheezing, chills or hemoptysis; No shortness of breath  CARDIOVASCULAR: No chest pain, palpitations, dizziness, or leg swelling  GASTROINTESTINAL: (+)Mild RLQ abdominal pain. No nausea, vomiting, or hematemesis; No diarrhea or constipation. No melena or hematochezia.  GENITOURINARY: No dysuria, frequency, hematuria, or incontinence  NEUROLOGICAL: No headaches, memory loss, loss of strength, numbness, or tremors  SKIN: No itching, burning, rashes, or lesions   LYMPH NODES: No enlarged glands  ENDOCRINE: No heat or cold intolerance; No hair loss  MUSCULOSKELETAL: No joint pain or swelling; No muscle, back, or extremity pain  PSYCHIATRIC: No depression, anxiety, mood swings, or difficulty sleeping  HEME/LYMPH: No easy bruising, or bleeding gums  ALLERGY AND IMMUNOLOGIC: No hives or eczema    T(C): 36.7 (06-12-25 @ 09:24), Max: 37.4 (06-12-25 @ 00:51)  HR: 77 (06-12-25 @ 09:24) (77 - 87)  BP: 139/66 (06-12-25 @ 09:24) (139/66 - 153/67)  RR: 18 (06-12-25 @ 09:24) (17 - 18)  SpO2: 95% (06-12-25 @ 09:24) (95% - 97%)  Wt(kg): --Vital Signs Last 24 Hrs  T(C): 36.7 (12 Jun 2025 09:24), Max: 37.4 (12 Jun 2025 00:51)  T(F): 98.1 (12 Jun 2025 09:24), Max: 99.4 (12 Jun 2025 00:51)  HR: 77 (12 Jun 2025 09:24) (77 - 87)  BP: 139/66 (12 Jun 2025 09:24) (139/66 - 153/67)  BP(mean): --  RR: 18 (12 Jun 2025 09:24) (17 - 18)  SpO2: 95% (12 Jun 2025 09:24) (95% - 97%)    Parameters below as of 12 Jun 2025 09:24  Patient On (Oxygen Delivery Method): room air        PHYSICAL EXAM:  GENERAL: NAD, well-groomed, well-developed  HEAD:  Atraumatic, Normocephalic  EYES: EOMI, PERRLA, conjunctiva and sclera clear  ENMT: No tonsillar erythema, exudates, or enlargement; Moist mucous membranes, Good dentition, No lesions  NECK: Supple, No JVD, Normal thyroid  NERVOUS SYSTEM:  Alert & Oriented X3, Good concentration; Motor Strength 5/5 B/L upper and lower extremities; DTRs 2+ intact and symmetric  CHEST/LUNG: Clear to percussion bilaterally; No rales, rhonchi, wheezing, or rubs  HEART: Regular rate and rhythm; No murmurs, rubs, or gallops  ABDOMEN: Soft, Nontender, mildly distended; Bowel sounds present  EXTREMITIES:  2+ Peripheral Pulses, No clubbing, cyanosis, or edema  LYMPH: No lymphadenopathy noted  SKIN: No rashes or lesions    Consultant(s) Notes Reviewed:  [x ] YES  [ ] NO  Care Discussed with Consultants/Other Providers [ x] YES  [ ] NO    LABS:  CBC   06-12-25 @ 04:35  Hematcorit 28.7  Hemoglobin 8.6  Mean Cell Hemoglobin 21.5  Platelet Count-Automated 497  RBC Count 4.00  Red Cell Distrib Width 22.4  Wbc Count 11.92      BMP  06-12-25 @ 04:35  Anion Gap. Serum 10  Blood Urea Nitrogen,Serm 12  Calcium, Total Serum 8.5  Carbon Dioxide, Serum 23  Chloride, Serum 104  Creatinine, Serum 1.20  eGFR in  --  eGFR in Non Afican American --  Gloucose, serum 181  Potassium, Serum 4.4  Sodium, Serum 137              06-11-25 @ 05:34  Anion Gap. Serum 15  Blood Urea Nitrogen,Serm 15  Calcium, Total Serum 8.2  Carbon Dioxide, Serum 22  Chloride, Serum 102  Creatinine, Serum 1.27  eGFR in  --  eGFR in Non Afican American --  Gloucose, serum 223  Potassium, Serum 4.7  Sodium, Serum 139              06-10-25 @ 06:23  Anion Gap. Serum 13  Blood Urea Nitrogen,Serm 15  Calcium, Total Serum 8.6  Carbon Dioxide, Serum 22  Chloride, Serum 98  Creatinine, Serum 1.24  eGFR in  --  eGFR in Non Afican American --  Gloucose, serum 128  Potassium, Serum 5.1  Sodium, Serum 133              06-09-25 @ 21:39  Anion Gap. Serum 15  Blood Urea Nitrogen,Serm 17  Calcium, Total Serum 8.7  Carbon Dioxide, Serum 20  Chloride, Serum 93  Creatinine, Serum 1.18  eGFR in  --  eGFR in Non Afican American --  Gloucose, serum 103  Potassium, Serum 5.6  Sodium, Serum 128                  CMP  06-12-25 @ 04:35  Amanda Aminotransferase(ALT/SGPT)--  Albumin, Serum --  Alkaline Phosphatase, Serum --  Anion Gap, Serum 10  Aspartate Aminotransferase (AST/SGOT)--  Bilirubin Total, Serum --  Blood Urea Nitrogen, Serum 12  Calcium,Total Serum 8.5  Carbon Dioxide, Serum 23  Chloride, Serum 104  Creatinine, Serum 1.20  eGFR if  --  eGFR if Non African American --  Glucose, Serum 181  Potassium, Serum 4.4  Protein Total, Serum --  Sodium, Serum 137                      06-11-25 @ 05:34  Amanda Aminotransferase(ALT/SGPT)--  Albumin, Serum --  Alkaline Phosphatase, Serum --  Anion Gap, Serum 15  Aspartate Aminotransferase (AST/SGOT)--  Bilirubin Total, Serum --  Blood Urea Nitrogen, Serum 15  Calcium,Total Serum 8.2  Carbon Dioxide, Serum 22  Chloride, Serum 102  Creatinine, Serum 1.27  eGFR if  --  eGFR if Non African American --  Glucose, Serum 223  Potassium, Serum 4.7  Protein Total, Serum --  Sodium, Serum 139                      06-10-25 @ 06:23  Amanda Aminotransferase(ALT/SGPT)--  Albumin, Serum --  Alkaline Phosphatase, Serum --  Anion Gap, Serum 13  Aspartate Aminotransferase (AST/SGOT)--  Bilirubin Total, Serum --  Blood Urea Nitrogen, Serum 15  Calcium,Total Serum 8.6  Carbon Dioxide, Serum 22  Chloride, Serum 98  Creatinine, Serum 1.24  eGFR if  --  eGFR if Non African American --  Glucose, Serum 128  Potassium, Serum 5.1  Protein Total, Serum --  Sodium, Serum 133                      06-09-25 @ 21:39  Amanda Aminotransferase(ALT/SGPT)16  Albumin, Serum 2.9  Alkaline Phosphatase, Serum 105  Anion Gap, Serum 15  Aspartate Aminotransferase (AST/SGOT)22  Bilirubin Total, Serum 0.4  Blood Urea Nitrogen, Serum 17  Calcium,Total Serum 8.7  Carbon Dioxide, Serum 20  Chloride, Serum 93  Creatinine, Serum 1.18  eGFR if  --  eGFR if Non African American --  Glucose, Serum 103  Potassium, Serum 5.6  Protein Total, Serum 7.4  Sodium, Serum 128                          PT/INR  PT/INR  06-12-25 @ 04:35  INR 1.07  Prothrombin Time Comment --  Prothrobin Time, [id number]  PT/INR  06-11-25 @ 05:34  INR 1.24  Prothrombin Time Comment --  Prothrobin Time, [id number]  PT/INR  06-09-25 @ 21:39  INR 1.01  Prothrombin Time Comment --  Prothrobin Time, [id number]      Amylase/Lipase  06-09-25 @ 21:39  Amylase, Serum Total --  Lipase, Serum 36 MEGAGAYLE TOMAS  89y  Female      Patient is a 89y old  Female who presents with a chief complaint of fungating infiltrative polypoid and ulcerated partially obstructing large mass was near to sigmoid colon (10 Brian 2025 09:21)    HPI:  Admitted and incidentally found to have L femoral DVT w/ IVC filter placed due to prior issue of GIB limiting AC. Pt with significant past medical history HTN, HLD, CAD, DM (not on insulin). No history of obstructive lung disease or smoking history. Good functional status, no history of stroke/TIA. Had cardiac stent placed in 2005.      deferred in favor of son Yobany @ bedside.    PAST MEDICAL/SURGICAL HISTORY  PAST MEDICAL & SURGICAL HISTORY:  Hypertension    CAD (coronary artery disease)          REVIEW OF SYSTEMS:  CONSTITUTIONAL: No fever, weight loss, or fatigue  EYES: No eye pain, visual disturbances, or discharge  ENMT:  No difficulty hearing, tinnitus, vertigo; No sinus or throat pain  NECK: No pain or stiffness  BREASTS: No pain, masses, or nipple discharge  RESPIRATORY: No cough, wheezing, chills or hemoptysis; No shortness of breath  CARDIOVASCULAR: No chest pain, palpitations, dizziness, or leg swelling  GASTROINTESTINAL: (+)Mild LLQ abdominal pain. No nausea, vomiting, or hematemesis; No diarrhea or constipation. No melena or hematochezia.  GENITOURINARY: No dysuria, frequency, hematuria, or incontinence  NEUROLOGICAL: No headaches, memory loss, loss of strength, numbness, or tremors  SKIN: No itching, burning, rashes, or lesions   LYMPH NODES: No enlarged glands  ENDOCRINE: No heat or cold intolerance; No hair loss  MUSCULOSKELETAL: No joint pain or swelling; No muscle, back, or extremity pain  PSYCHIATRIC: No depression, anxiety, mood swings, or difficulty sleeping  HEME/LYMPH: No easy bruising, or bleeding gums  ALLERGY AND IMMUNOLOGIC: No hives or eczema    T(C): 36.7 (06-12-25 @ 09:24), Max: 37.4 (06-12-25 @ 00:51)  HR: 77 (06-12-25 @ 09:24) (77 - 87)  BP: 139/66 (06-12-25 @ 09:24) (139/66 - 153/67)  RR: 18 (06-12-25 @ 09:24) (17 - 18)  SpO2: 95% (06-12-25 @ 09:24) (95% - 97%)  Wt(kg): --Vital Signs Last 24 Hrs  T(C): 36.7 (12 Jun 2025 09:24), Max: 37.4 (12 Jun 2025 00:51)  T(F): 98.1 (12 Jun 2025 09:24), Max: 99.4 (12 Jun 2025 00:51)  HR: 77 (12 Jun 2025 09:24) (77 - 87)  BP: 139/66 (12 Jun 2025 09:24) (139/66 - 153/67)  BP(mean): --  RR: 18 (12 Jun 2025 09:24) (17 - 18)  SpO2: 95% (12 Jun 2025 09:24) (95% - 97%)    Parameters below as of 12 Jun 2025 09:24  Patient On (Oxygen Delivery Method): room air        PHYSICAL EXAM:  GENERAL: NAD, well-groomed, well-developed  HEAD:  Atraumatic, Normocephalic  EYES: EOMI, PERRLA, conjunctiva and sclera clear  ENMT: No tonsillar erythema, exudates, or enlargement; Moist mucous membranes, Good dentition, No lesions  NECK: Supple, No JVD, Normal thyroid  NERVOUS SYSTEM:  Alert & Oriented X3, Good concentration; Motor Strength 5/5 B/L upper and lower extremities; DTRs 2+ intact and symmetric  CHEST/LUNG: Clear to percussion bilaterally; No rales, rhonchi, wheezing, or rubs  HEART: Regular rate and rhythm; No murmurs, rubs, or gallops  ABDOMEN: Soft, Nontender, mildly distended; Bowel sounds present  EXTREMITIES:  2+ Peripheral Pulses, No clubbing, cyanosis, or edema  LYMPH: No lymphadenopathy noted  SKIN: No rashes or lesions    Consultant(s) Notes Reviewed:  [x ] YES  [ ] NO  Care Discussed with Consultants/Other Providers [ x] YES  [ ] NO    LABS:  CBC   06-12-25 @ 04:35  Hematcorit 28.7  Hemoglobin 8.6  Mean Cell Hemoglobin 21.5  Platelet Count-Automated 497  RBC Count 4.00  Red Cell Distrib Width 22.4  Wbc Count 11.92      BMP  06-12-25 @ 04:35  Anion Gap. Serum 10  Blood Urea Nitrogen,Serm 12  Calcium, Total Serum 8.5  Carbon Dioxide, Serum 23  Chloride, Serum 104  Creatinine, Serum 1.20  eGFR in  --  eGFR in Non Afican American --  Gloucose, serum 181  Potassium, Serum 4.4  Sodium, Serum 137              06-11-25 @ 05:34  Anion Gap. Serum 15  Blood Urea Nitrogen,Serm 15  Calcium, Total Serum 8.2  Carbon Dioxide, Serum 22  Chloride, Serum 102  Creatinine, Serum 1.27  eGFR in  --  eGFR in Non Afican American --  Gloucose, serum 223  Potassium, Serum 4.7  Sodium, Serum 139              06-10-25 @ 06:23  Anion Gap. Serum 13  Blood Urea Nitrogen,Serm 15  Calcium, Total Serum 8.6  Carbon Dioxide, Serum 22  Chloride, Serum 98  Creatinine, Serum 1.24  eGFR in  --  eGFR in Non Afican American --  Gloucose, serum 128  Potassium, Serum 5.1  Sodium, Serum 133              06-09-25 @ 21:39  Anion Gap. Serum 15  Blood Urea Nitrogen,Serm 17  Calcium, Total Serum 8.7  Carbon Dioxide, Serum 20  Chloride, Serum 93  Creatinine, Serum 1.18  eGFR in  --  eGFR in Non Afican American --  Gloucose, serum 103  Potassium, Serum 5.6  Sodium, Serum 128                  CMP  06-12-25 @ 04:35  Amanda Aminotransferase(ALT/SGPT)--  Albumin, Serum --  Alkaline Phosphatase, Serum --  Anion Gap, Serum 10  Aspartate Aminotransferase (AST/SGOT)--  Bilirubin Total, Serum --  Blood Urea Nitrogen, Serum 12  Calcium,Total Serum 8.5  Carbon Dioxide, Serum 23  Chloride, Serum 104  Creatinine, Serum 1.20  eGFR if  --  eGFR if Non African American --  Glucose, Serum 181  Potassium, Serum 4.4  Protein Total, Serum --  Sodium, Serum 137                      06-11-25 @ 05:34  Amanda Aminotransferase(ALT/SGPT)--  Albumin, Serum --  Alkaline Phosphatase, Serum --  Anion Gap, Serum 15  Aspartate Aminotransferase (AST/SGOT)--  Bilirubin Total, Serum --  Blood Urea Nitrogen, Serum 15  Calcium,Total Serum 8.2  Carbon Dioxide, Serum 22  Chloride, Serum 102  Creatinine, Serum 1.27  eGFR if  --  eGFR if Non African American --  Glucose, Serum 223  Potassium, Serum 4.7  Protein Total, Serum --  Sodium, Serum 139                      06-10-25 @ 06:23  Amanda Aminotransferase(ALT/SGPT)--  Albumin, Serum --  Alkaline Phosphatase, Serum --  Anion Gap, Serum 13  Aspartate Aminotransferase (AST/SGOT)--  Bilirubin Total, Serum --  Blood Urea Nitrogen, Serum 15  Calcium,Total Serum 8.6  Carbon Dioxide, Serum 22  Chloride, Serum 98  Creatinine, Serum 1.24  eGFR if  --  eGFR if Non African American --  Glucose, Serum 128  Potassium, Serum 5.1  Protein Total, Serum --  Sodium, Serum 133                      06-09-25 @ 21:39  Amanda Aminotransferase(ALT/SGPT)16  Albumin, Serum 2.9  Alkaline Phosphatase, Serum 105  Anion Gap, Serum 15  Aspartate Aminotransferase (AST/SGOT)22  Bilirubin Total, Serum 0.4  Blood Urea Nitrogen, Serum 17  Calcium,Total Serum 8.7  Carbon Dioxide, Serum 20  Chloride, Serum 93  Creatinine, Serum 1.18  eGFR if  --  eGFR if Non African American --  Glucose, Serum 103  Potassium, Serum 5.6  Protein Total, Serum 7.4  Sodium, Serum 128                          PT/INR  PT/INR  06-12-25 @ 04:35  INR 1.07  Prothrombin Time Comment --  Prothrobin Time, [id number]  PT/INR  06-11-25 @ 05:34  INR 1.24  Prothrombin Time Comment --  Prothrobin Time, [id number]  PT/INR  06-09-25 @ 21:39  INR 1.01  Prothrombin Time Comment --  Prothrobin Time, [id number]      Amylase/Lipase  06-09-25 @ 21:39  Amylase, Serum Total --  Lipase, Serum 36

## 2025-06-12 NOTE — PROGRESS NOTE ADULT - SUBJECTIVE AND OBJECTIVE BOX
[facility] Attending Follow-up Note     Patient seen and examined at bedside.    Overnight Events:     events reviewed     REVIEW OF SYSTEMS:  Constitutional:     [x ] negative [ ] fevers [ ] chills [ ] weight loss [ ] weight gain  HEENT:                  [x ] negative [ ] dry eyes [ ] eye irritation [ ] postnasal drip [ ] nasal congestion  CV:                         [ x] negative  [ ] chest pain [ ] orthopnea [ ] palpitations [ ] murmur  Resp:                     [ x] negative [ ] cough [ ] shortness of breath [ ] dyspnea [ ] wheezing [ ] sputum [ ]hemoptysis  GI:                          [ x] negative [ ] nausea [ ] vomiting [ ] diarrhea [ ] constipation [ ] abd pain [ ] dysphagia   :                        [ x] negative [ ] dysuria [ ] nocturia [ ] hematuria [ ] increased urinary frequency  Musculoskeletal: [ x] negative [ ] back pain [ ] myalgias [ ] arthralgias [ ] fracture  Skin:                       [ x] negative [ ] rash [ ] itch  Neurological:        [x ] negative [ ] headache [ ] dizziness [ ] syncope [ ] weakness [ ] numbness  Psychiatric:           [ x] negative [ ] anxiety [ ] depression  Endocrine:            [ x] negative [ ] diabetes [ ] thyroid problem  Heme/Lymph:      [ x] negative [ ] anemia [ ] bleeding problem  Allergic/Immune: [ x] negative [ ] itchy eyes [ ] nasal discharge [ ] hives [ ] angioedema    [ x] All other systems negative  [ ] Unable to assess ROS due to    Current Meds:  acetaminophen     Tablet .. 650 milliGRAM(s) Oral every 6 hours  dextrose 5% + sodium chloride 0.45% with potassium chloride 20 mEq/L 1000 milliLiter(s) IV Continuous <Continuous>  melatonin 6 milliGRAM(s) Oral at bedtime PRN  piperacillin/tazobactam IVPB.. 3.375 Gram(s) IV Intermittent every 8 hours      PAST MEDICAL & SURGICAL HISTORY:  Hypertension      CAD (coronary artery disease)          Vitals:  T(F): 98.1 (06-12), Max: 99.4 (06-12)  HR: 77 (06-12) (77 - 87)  BP: 139/66 (06-12) (139/66 - 153/67)  RR: 18 (06-12)  SpO2: 95% (06-12)  I&O's Summary    11 Jun 2025 07:01  -  12 Jun 2025 07:00  --------------------------------------------------------  IN: 2000 mL / OUT: 1050 mL / NET: 950 mL    12 Jun 2025 07:01  -  12 Jun 2025 09:26  --------------------------------------------------------  IN: 400 mL / OUT: 200 mL / NET: 200 mL        Physical Exam:  Appearance: No acute distress  HENT: No JVD   Cardiovascular: RRR, S1/S2, + murmurs  Respiratory: CTABL  Gastrointestinal: soft, NT ND, +BS  Musculoskeletal: No clubbing, no edema   Neurologic: Non-focal  Skin: No rashes, ecchymoses, or cyanosis                          8.6    11.92 )-----------( 497      ( 12 Jun 2025 04:35 )             28.7     06-12    137  |  104  |  12  ----------------------------<  181[H]  4.4   |  23  |  1.20    Ca    8.5      12 Jun 2025 04:35  Phos  3.4     06-12  Mg     2.20     06-12      PT/INR - ( 12 Jun 2025 04:35 )   PT: 12.7 sec;   INR: 1.07 ratio         PTT - ( 12 Jun 2025 04:35 )  PTT:26.0 sec              Cardiovascular Testings:   CONCLUSIONS:      1. Left ventricular wall thickness is normal. Left ventricular systolic function is normal with an ejection fraction of 62 % by Van's method of disks. Regional wall motion abnormalities consistent with ischemic heart disease.   2. Analysis of left ventricular diastolic function and filling pressure is made challenging by the presence of severe mitral regurgitation.   3. Normal right ventricular cavity size, with normal wall thickness, and normal right ventricular systolic function.   4. Left atrium is mildly dilated.   5. Structurally normal mitral valve with normal leaflet excursion. There is calcification of the mitral valve annulus. Thereis mild mitral valve stenosis. Possibly torn chordae, seen best on apical views. There is severe mitral regurgitation. The mitral regurgitant jet is eccentrically directed.   6. The inferior vena cava is normal in size (normal <2.1cm) with normal inspiratory collapse (normal >50%) consistent with normal right atrial pressure (~3, range 0-5mmHg).   7. Bilateral pleural effusion noted.   8. Small pericardial effusion noted adjacent to the anterior right ventricle.

## 2025-06-12 NOTE — CONSULT NOTE ADULT - ASSESSMENT
89F PMH ?DM, CAD, HTN, HLD, and recently diagnosed Colon Ca following admission @ [facility] for anemia requiring transfuion - currently admitted for retroperitoneal collection w/ fungating sigmoid mass w/ course complicated by left common femoral vein DVT s/p IVC placement. Medicine consulted for medical clearance for Dusty's procedure.     Assessment:   -Cardiovascular Risk Stratification (Emanuel Criteria) - RCRI = 2. 10% periprocedural MACE risk    History of ischemic heart disease: Yes  History of congestive heart failure: No  History of cerebrovascular disease (stroke or transient ischemic attack): No  History of diabetes requiring preoperative insulin use: No  Chronic kidney disease (creatinine > 2 mg/dL): No  Undergoing suprainguinal vascular, intraperitoneal, or intrathoracic surgery: Yes    Overall this patient is as 10% risk (for cardiac death, nonfatal myocardial infarction, and nonfatal cardiac arrest perioperatively for Dusty's procedure. Patient currently does not show any clinical evidence of decompensated heart failure, cardiac arrhythmias, or active ischemia.  According to ACC/AHA 2014 Guideline on Perioperative Cardiovascular Evaluation and Management (Circulation. 2014;130:e278–e333), no further cardiac testing is recommended. May proceed after discussion and shared-decision making with regarding the risk, benefits, and alternatives to the procedure by procedure-performing physician with patient or surrogate decision maker.      By NQSIP standards, pt below average risk for other complications aside from discharge to nursing facility and UTI (above average on both).     Recommendations:  -No medical contraindications to procedure, pt optimized for procedure  -Cardiac clearance per cardiology    **********************Note not finalized until attested by attending*************************

## 2025-06-13 LAB
-  AMOXICILLIN/CLAVULANIC ACID: SIGNIFICANT CHANGE UP
-  AMPICILLIN/SULBACTAM: SIGNIFICANT CHANGE UP
-  AMPICILLIN: SIGNIFICANT CHANGE UP
-  AZTREONAM: SIGNIFICANT CHANGE UP
-  CEFAZOLIN: SIGNIFICANT CHANGE UP
-  CEFEPIME: SIGNIFICANT CHANGE UP
-  CEFOXITIN: SIGNIFICANT CHANGE UP
-  CEFTRIAXONE: SIGNIFICANT CHANGE UP
-  CEFUROXIME: SIGNIFICANT CHANGE UP
-  CIPROFLOXACIN: SIGNIFICANT CHANGE UP
-  ERTAPENEM: SIGNIFICANT CHANGE UP
-  GENTAMICIN: SIGNIFICANT CHANGE UP
-  IMIPENEM: SIGNIFICANT CHANGE UP
-  LEVOFLOXACIN: SIGNIFICANT CHANGE UP
-  MEROPENEM: SIGNIFICANT CHANGE UP
-  NITROFURANTOIN: SIGNIFICANT CHANGE UP
-  PIPERACILLIN/TAZOBACTAM: SIGNIFICANT CHANGE UP
-  TIGECYCLINE: SIGNIFICANT CHANGE UP
-  TOBRAMYCIN: SIGNIFICANT CHANGE UP
-  TRIMETHOPRIM/SULFAMETHOXAZOLE: SIGNIFICANT CHANGE UP
A1C WITH ESTIMATED AVERAGE GLUCOSE RESULT: 5.9 % — HIGH (ref 4–5.6)
ANION GAP SERPL CALC-SCNC: 13 MMOL/L — SIGNIFICANT CHANGE UP (ref 7–14)
BLD GP AB SCN SERPL QL: NEGATIVE — SIGNIFICANT CHANGE UP
BUN SERPL-MCNC: 10 MG/DL — SIGNIFICANT CHANGE UP (ref 7–23)
CALCIUM SERPL-MCNC: 9.2 MG/DL — SIGNIFICANT CHANGE UP (ref 8.4–10.5)
CHLORIDE SERPL-SCNC: 105 MMOL/L — SIGNIFICANT CHANGE UP (ref 98–107)
CO2 SERPL-SCNC: 20 MMOL/L — LOW (ref 22–31)
CREAT SERPL-MCNC: 1.3 MG/DL — SIGNIFICANT CHANGE UP (ref 0.5–1.3)
CULTURE RESULTS: ABNORMAL
EGFR: 39 ML/MIN/1.73M2 — LOW
EGFR: 39 ML/MIN/1.73M2 — LOW
ESTIMATED AVERAGE GLUCOSE: 123 — SIGNIFICANT CHANGE UP
GLUCOSE SERPL-MCNC: 101 MG/DL — HIGH (ref 70–99)
HCT VFR BLD CALC: 31.9 % — LOW (ref 34.5–45)
HGB BLD-MCNC: 9.2 G/DL — LOW (ref 11.5–15.5)
MAGNESIUM SERPL-MCNC: 2.2 MG/DL — SIGNIFICANT CHANGE UP (ref 1.6–2.6)
MCHC RBC-ENTMCNC: 21.1 PG — LOW (ref 27–34)
MCHC RBC-ENTMCNC: 28.8 G/DL — LOW (ref 32–36)
MCV RBC AUTO: 73.2 FL — LOW (ref 80–100)
METHOD TYPE: SIGNIFICANT CHANGE UP
NRBC # BLD AUTO: 0 K/UL — SIGNIFICANT CHANGE UP (ref 0–0)
NRBC # FLD: 0 K/UL — SIGNIFICANT CHANGE UP (ref 0–0)
NRBC BLD AUTO-RTO: 0 /100 WBCS — SIGNIFICANT CHANGE UP (ref 0–0)
ORGANISM # SPEC MICROSCOPIC CNT: ABNORMAL
ORGANISM # SPEC MICROSCOPIC CNT: ABNORMAL
PHOSPHATE SERPL-MCNC: 4 MG/DL — SIGNIFICANT CHANGE UP (ref 2.5–4.5)
PLATELET # BLD AUTO: 549 K/UL — HIGH (ref 150–400)
POTASSIUM SERPL-MCNC: 4.3 MMOL/L — SIGNIFICANT CHANGE UP (ref 3.5–5.3)
POTASSIUM SERPL-SCNC: 4.3 MMOL/L — SIGNIFICANT CHANGE UP (ref 3.5–5.3)
RBC # BLD: 4.36 M/UL — SIGNIFICANT CHANGE UP (ref 3.8–5.2)
RBC # FLD: 22.7 % — HIGH (ref 10.3–14.5)
RH IG SCN BLD-IMP: POSITIVE — SIGNIFICANT CHANGE UP
SODIUM SERPL-SCNC: 138 MMOL/L — SIGNIFICANT CHANGE UP (ref 135–145)
SPECIMEN SOURCE: SIGNIFICANT CHANGE UP
WBC # BLD: 8.9 K/UL — SIGNIFICANT CHANGE UP (ref 3.8–10.5)
WBC # FLD AUTO: 8.9 K/UL — SIGNIFICANT CHANGE UP (ref 3.8–10.5)

## 2025-06-13 PROCEDURE — 99232 SBSQ HOSP IP/OBS MODERATE 35: CPT | Mod: FS

## 2025-06-13 RX ADMIN — Medication 650 MILLIGRAM(S): at 23:38

## 2025-06-13 RX ADMIN — Medication 650 MILLIGRAM(S): at 11:45

## 2025-06-13 RX ADMIN — Medication 650 MILLIGRAM(S): at 00:09

## 2025-06-13 RX ADMIN — Medication 25 GRAM(S): at 18:08

## 2025-06-13 RX ADMIN — ENOXAPARIN SODIUM 40 MILLIGRAM(S): 100 INJECTION SUBCUTANEOUS at 23:39

## 2025-06-13 RX ADMIN — Medication 6 MILLIGRAM(S): at 22:21

## 2025-06-13 RX ADMIN — Medication 650 MILLIGRAM(S): at 06:51

## 2025-06-13 RX ADMIN — Medication 25 GRAM(S): at 10:08

## 2025-06-13 RX ADMIN — Medication 650 MILLIGRAM(S): at 19:00

## 2025-06-13 RX ADMIN — Medication 650 MILLIGRAM(S): at 06:02

## 2025-06-13 RX ADMIN — METOPROLOL SUCCINATE 25 MILLIGRAM(S): 50 TABLET, EXTENDED RELEASE ORAL at 06:02

## 2025-06-13 RX ADMIN — Medication 650 MILLIGRAM(S): at 18:07

## 2025-06-13 RX ADMIN — ATORVASTATIN CALCIUM 40 MILLIGRAM(S): 80 TABLET, FILM COATED ORAL at 22:01

## 2025-06-13 RX ADMIN — Medication 25 GRAM(S): at 03:22

## 2025-06-13 RX ADMIN — AMLODIPINE BESYLATE 5 MILLIGRAM(S): 10 TABLET ORAL at 06:02

## 2025-06-13 RX ADMIN — METOPROLOL SUCCINATE 25 MILLIGRAM(S): 50 TABLET, EXTENDED RELEASE ORAL at 18:06

## 2025-06-13 NOTE — ADVANCED PRACTICE NURSE CONSULT - ASSESSMENT
Patient AxOx4, general overview of surgery with need for ostomy reviewed. Questions answered. Abdomen rounded with creases and folds, no scar tissue present.   Stoma marking placed to RUQ, RLQ, LUQ and LLQ within patients visual field, within rectus muscle, away from creases and folds and belt line.     Daughter interviewed stating that the son and family members do not want patient to know she has cancer as it would "break" her. They have been filling her in that she has inflammation and an abdominal collection that she needs surgery for. As per daughter, spoke about having an ostomy pouch after surgery as well. Surgical team following- to have family meeting today.

## 2025-06-13 NOTE — PROGRESS NOTE ADULT - SUBJECTIVE AND OBJECTIVE BOX
TEAM [ A ] Surgery Daily Progress Note  =====================================================    SUBJECTIVE: Patient seen and examined at bedside on AM rounds. Patient reports that they're feeling well. Passing gas and having bowel movements; last yesterday. Tolerating soup and water. Denies fever, chills, N/V, chest pain, SOB    ALLERGIES:  No Known Allergies      --------------------------------------------------------------------------------------    MEDICATIONS:  acetaminophen     Tablet .. 650 milliGRAM(s) Oral every 6 hours  amLODIPine   Tablet 5 milliGRAM(s) Oral daily  atorvastatin 40 milliGRAM(s) Oral at bedtime  enoxaparin Injectable 40 milliGRAM(s) SubCutaneous every 24 hours  melatonin 6 milliGRAM(s) Oral at bedtime PRN  metoprolol tartrate 25 milliGRAM(s) Oral two times a day  piperacillin/tazobactam IVPB.. 3.375 Gram(s) IV Intermittent every 8 hours  sertraline 25 milliGRAM(s) Oral daily    --------------------------------------------------------------------------------------    VITAL SIGNS:  T(C): 36.5 (06-13-25 @ 04:50), Max: 36.7 (06-12-25 @ 09:24)  HR: 70 (06-13-25 @ 04:50) (70 - 83)  BP: 169/80 (06-13-25 @ 04:50) (139/66 - 169/80)  RR: 18 (06-13-25 @ 04:50) (16 - 25)  SpO2: 98% (06-13-25 @ 04:50) (95% - 98%)  --------------------------------------------------------------------------------------    INS AND OUTS:    06-11-25 @ 07:01  -  06-12-25 @ 07:00  --------------------------------------------------------  IN: 2000 mL / OUT: 1050 mL / NET: 950 mL    06-12-25 @ 07:01  -  06-13-25 @ 06:57  --------------------------------------------------------  IN: 1480 mL / OUT: 505 mL / NET: 975 mL      --------------------------------------------------------------------------------------      EXAM    General: NAD, resting in bed comfortably.  Cardiac: regular rate, warm and well perfused  Respiratory: Nonlabored respirations, normal cw expansion.  Abdomen: soft, nontender, nondistended, IR drain with SS output  Extremities: normal strength, FROM, no deformities    --------------------------------------------------------------------------------------    LABS                        9.2    8.90  )-----------( 549      ( 13 Jun 2025 03:51 )             31.9   06-13    138  |  105  |  10  ----------------------------<  101[H]  4.3   |  20[L]  |  1.30    Ca    9.2      13 Jun 2025 03:51  Phos  4.0     06-13  Mg     2.20     06-13

## 2025-06-13 NOTE — PROGRESS NOTE ADULT - SUBJECTIVE AND OBJECTIVE BOX
89y Female s/p retroperitoneal drainage placement on 6/12 with Interventional Radiology.     Patient seen and examined at bedside, resting comfortably. Patient Cameroonian speaking, offered , however patient wants family at bedside to translate.   No complaints offered, she denies pain at site.   RP Drain with c/d/i dressing. Drainage bag with sanguinous output within bag and tubing. Forward flushed with 5cc NS without resistance or difficulty.     T(F): 97.9 (06-13-25 @ 09:00), Max: 98 (06-12-25 @ 12:08)  HR: 74 (06-13-25 @ 09:00) (70 - 83)  BP: 146/79 (06-13-25 @ 09:00) (139/101 - 169/80)  RR: 18 (06-13-25 @ 09:00) (16 - 25)  SpO2: 99% (06-13-25 @ 09:00) (95% - 99%)    LABS:                        9.2    8.90  )-----------( 549      ( 13 Jun 2025 03:51 )             31.9     06-13    138  |  105  |  10  ----------------------------<  101[H]  4.3   |  20[L]  |  1.30    Ca    9.2      13 Jun 2025 03:51  Phos  4.0     06-13  Mg     2.20     06-13      PT/INR - ( 12 Jun 2025 04:35 )   PT: 12.7 sec;   INR: 1.07 ratio    PTT - ( 12 Jun 2025 04:35 )  PTT:26.0 sec    I&O's Detail    12 Jun 2025 07:01  -  13 Jun 2025 07:00  --------------------------------------------------------  IN:    dextrose 5% + sodium chloride 0.45% w/ Additives: 800 mL    IV PiggyBack: 100 mL    Oral Fluid: 680 mL  Total IN: 1580 mL    OUT:    Drain (mL): 5 mL    Voided (mL): 500 mL  Total OUT: 505 mL    Total NET: 1075 mL      13 Jun 2025 07:01  -  13 Jun 2025 12:00  --------------------------------------------------------  IN:    Oral Fluid: 480 mL  Total IN: 480 mL    OUT:    Drain (mL): 2.5 mL  Total OUT: 2.5 mL    Total NET: 477.5 mL    PHYSICAL EXAM:  General: Nontoxic, resting comfortably in NAD  RP Drain: dressing c/d/i. Drainage bag and tubing with small amount of sanguinous output. Forward flushed with 5cc NS without resistance or difficulty.  89y Female s/p retroperitoneal drainage placement on 6/12 with Interventional Radiology.     Patient seen and examined at bedside, resting comfortably. Patient Omani speaking, offered , however patient wants family at bedside to translate.   No complaints offered, she denies pain at site.   RP Drain with c/d/i dressing. Drainage bag with sanguinous output within bag and tubing. Forward flushed with 5cc NS without resistance or difficulty.     T(F): 97.9 (06-13-25 @ 09:00), Max: 98 (06-12-25 @ 12:08)  HR: 74 (06-13-25 @ 09:00) (70 - 83)  BP: 146/79 (06-13-25 @ 09:00) (139/101 - 169/80)  RR: 18 (06-13-25 @ 09:00) (16 - 25)  SpO2: 99% (06-13-25 @ 09:00) (95% - 99%)    LABS:                        9.2    8.90  )-----------( 549      ( 13 Jun 2025 03:51 )             31.9     06-13    138  |  105  |  10  ----------------------------<  101[H]  4.3   |  20[L]  |  1.30    Ca    9.2      13 Jun 2025 03:51  Phos  4.0     06-13  Mg     2.20     06-13      PT/INR - ( 12 Jun 2025 04:35 )   PT: 12.7 sec;   INR: 1.07 ratio    PTT - ( 12 Jun 2025 04:35 )  PTT:26.0 sec    I&O's Detail    12 Jun 2025 07:01  -  13 Jun 2025 07:00  --------------------------------------------------------  IN:    dextrose 5% + sodium chloride 0.45% w/ Additives: 800 mL    IV PiggyBack: 100 mL    Oral Fluid: 680 mL  Total IN: 1580 mL    OUT:    Drain (mL): 5 mL    Voided (mL): 500 mL  Total OUT: 505 mL    Total NET: 1075 mL      13 Jun 2025 07:01  -  13 Jun 2025 12:00  --------------------------------------------------------  IN:    Oral Fluid: 480 mL  Total IN: 480 mL    OUT:    Drain (mL): 2.5 mL  Total OUT: 2.5 mL    Total NET: 477.5 mL    PHYSICAL EXAM:  General: Nontoxic, resting comfortably in NAD  RP Drain: dressing c/d/i. Drainage bag and tubing with small amount of sanguinous output. Forward flushed with 5cc NS without resistance or difficulty.

## 2025-06-13 NOTE — ADVANCED PRACTICE NURSE CONSULT - REASON FOR CONSULT
Patient seen for preop stoma marking. Patient with h/o DM, CAD, HTN, HLD, and recently diagnosed Colon Ca. Patient was recently diagnosed with colon cancer at [facility] where she was admitted for a lower GI bleed and low Hg of 6.5 s/p 3U PRBCs. Incidental left common femoral vein filling defect noted on CTAP imaging on this admission (6/10/25)concerning for DVT s/p IVC filter 6/11. S/p IR drainage of abdominal collection 6/12. Plan for surgical resection with ostomy Monday 6/16.

## 2025-06-13 NOTE — PROGRESS NOTE ADULT - ASSESSMENT
89F PMH DM, CAD, HTN, HLD, and recently diagnosed Colon Ca. Patient was recently diagnosed with colon cancer at [facility] where she was admitted for a lower GI bleed and low Hg of 6.5, per son, patient had had blood in the stool for several months before admission. At [facility] patient had serval blood transfusions and had a CT scan showing  mural thickening and adjacent stranding of proximal sigmoid colon, suspicious of mural neoplasm, following colonoscopy showed  melanosis in colon,  fungating infiltrative polypoid and ulcerated partially obstructing large mass was near to sigmoid colon (per chart review) . Incidental left common femoral vein filling defect noted on CTAP imaging on this admission (6/10/25)concerning for DVT s/p IVC filter 6/11. S/p IR drainage of abdominal collection 6/12. Plan for surgical resection with ostomy Monday 6/16    Plan:  - OR planning Monday 6/16  - IV Abx: zosyn 6/10/25  - Pain control PRN  - Diet: clear liquids  - Activity: OOB  - DVT ppx: SCD's   - Wound care consult for ostomy marking today  - DVT prophylaxis   - Med and cards risk stratification documented 6/12    A team surgery [id number]

## 2025-06-13 NOTE — PROVIDER CONTACT NOTE (CRITICAL VALUE NOTIFICATION) - BACKGROUND
Patient 6/p IVC filter placement on 6/11 and IR drain placement on 6/12 for abdominal fluid collection

## 2025-06-13 NOTE — PROGRESS NOTE ADULT - ASSESSMENT
89 year old female with PMHx DM, CAD, HTN, HLD, and recently diagnosed Colon Ca, admitted for retroperitoneal collection w/ fungating sigmoid mass w/ course complicated by left common femoral vein DVT s/p IVC placement on 6/11 with IR. Patient now status post RP drainage catheter placement on 6/12 with IR.    Plan:  - Continue drainage, monitor output  - Forward flush drain with 5cc NS BID for the next 4 days (6/13-6/16)  - Can be discharged home with drain if outputs remain high  - If patient to be discharged with drainage catheter, please call IR spectra [phone] to schedule outpatient appoint for noncontrast CT + IR tube check.     [id number]

## 2025-06-14 LAB
ANION GAP SERPL CALC-SCNC: 13 MMOL/L — SIGNIFICANT CHANGE UP (ref 7–14)
BUN SERPL-MCNC: 11 MG/DL — SIGNIFICANT CHANGE UP (ref 7–23)
CALCIUM SERPL-MCNC: 9 MG/DL — SIGNIFICANT CHANGE UP (ref 8.4–10.5)
CHLORIDE SERPL-SCNC: 106 MMOL/L — SIGNIFICANT CHANGE UP (ref 98–107)
CO2 SERPL-SCNC: 20 MMOL/L — LOW (ref 22–31)
CREAT SERPL-MCNC: 1.17 MG/DL — SIGNIFICANT CHANGE UP (ref 0.5–1.3)
EGFR: 44 ML/MIN/1.73M2 — LOW
EGFR: 44 ML/MIN/1.73M2 — LOW
GLUCOSE SERPL-MCNC: 88 MG/DL — SIGNIFICANT CHANGE UP (ref 70–99)
HCT VFR BLD CALC: 30 % — LOW (ref 34.5–45)
HGB BLD-MCNC: 9 G/DL — LOW (ref 11.5–15.5)
MAGNESIUM SERPL-MCNC: 2.2 MG/DL — SIGNIFICANT CHANGE UP (ref 1.6–2.6)
MCHC RBC-ENTMCNC: 21.4 PG — LOW (ref 27–34)
MCHC RBC-ENTMCNC: 30 G/DL — LOW (ref 32–36)
MCV RBC AUTO: 71.3 FL — LOW (ref 80–100)
NRBC # BLD AUTO: 0 K/UL — SIGNIFICANT CHANGE UP (ref 0–0)
NRBC # FLD: 0 K/UL — SIGNIFICANT CHANGE UP (ref 0–0)
NRBC BLD AUTO-RTO: 0 /100 WBCS — SIGNIFICANT CHANGE UP (ref 0–0)
PHOSPHATE SERPL-MCNC: 3.9 MG/DL — SIGNIFICANT CHANGE UP (ref 2.5–4.5)
PLATELET # BLD AUTO: 544 K/UL — HIGH (ref 150–400)
POTASSIUM SERPL-MCNC: 3.9 MMOL/L — SIGNIFICANT CHANGE UP (ref 3.5–5.3)
POTASSIUM SERPL-SCNC: 3.9 MMOL/L — SIGNIFICANT CHANGE UP (ref 3.5–5.3)
RBC # BLD: 4.21 M/UL — SIGNIFICANT CHANGE UP (ref 3.8–5.2)
RBC # FLD: 23.3 % — HIGH (ref 10.3–14.5)
SODIUM SERPL-SCNC: 139 MMOL/L — SIGNIFICANT CHANGE UP (ref 135–145)
WBC # BLD: 7.09 K/UL — SIGNIFICANT CHANGE UP (ref 3.8–10.5)
WBC # FLD AUTO: 7.09 K/UL — SIGNIFICANT CHANGE UP (ref 3.8–10.5)

## 2025-06-14 PROCEDURE — 99233 SBSQ HOSP IP/OBS HIGH 50: CPT

## 2025-06-14 RX ADMIN — Medication 25 GRAM(S): at 18:58

## 2025-06-14 RX ADMIN — Medication 25 GRAM(S): at 11:59

## 2025-06-14 RX ADMIN — Medication 650 MILLIGRAM(S): at 18:56

## 2025-06-14 RX ADMIN — Medication 650 MILLIGRAM(S): at 19:47

## 2025-06-14 RX ADMIN — Medication 25 GRAM(S): at 02:32

## 2025-06-14 RX ADMIN — METOPROLOL SUCCINATE 25 MILLIGRAM(S): 50 TABLET, EXTENDED RELEASE ORAL at 06:21

## 2025-06-14 RX ADMIN — Medication 650 MILLIGRAM(S): at 06:17

## 2025-06-14 RX ADMIN — AMLODIPINE BESYLATE 5 MILLIGRAM(S): 10 TABLET ORAL at 06:17

## 2025-06-14 RX ADMIN — Medication 650 MILLIGRAM(S): at 07:11

## 2025-06-14 RX ADMIN — METOPROLOL SUCCINATE 25 MILLIGRAM(S): 50 TABLET, EXTENDED RELEASE ORAL at 18:56

## 2025-06-14 RX ADMIN — Medication 650 MILLIGRAM(S): at 00:38

## 2025-06-14 NOTE — PROGRESS NOTE ADULT - SUBJECTIVE AND OBJECTIVE BOX
GENERAL SURGERY PROGRESS NOTE    GAYLE SPANN  |  [id number]      S: NAEON. resting comfortably this AM, no concerns, leidy diet, no n/v, pending OR monday     O:   Vital Signs Last 24 Hrs  T(C): 36.4 (14 Jun 2025 08:42), Max: 36.9 (13 Jun 2025 17:01)  T(F): 97.6 (14 Jun 2025 08:42), Max: 98.4 (13 Jun 2025 17:01)  HR: 60 (14 Jun 2025 08:42) (60 - 78)  BP: 144/59 (14 Jun 2025 08:42) (132/58 - 150/70)  BP(mean): --  RR: 16 (14 Jun 2025 08:42) (16 - 18)  SpO2: 96% (14 Jun 2025 08:42) (95% - 99%)    Parameters below as of 14 Jun 2025 08:42  Patient On (Oxygen Delivery Method): room air                            9.0    7.09  )-----------( 544      ( 14 Jun 2025 05:10 )             30.0     06-14    139  |  106  |  11  ----------------------------<  88  3.9   |  20[L]  |  1.17    Ca    9.0      14 Jun 2025 05:10  Phos  3.9     06-14  Mg     2.20     06-14 06-13-25 @ 07:01  -  06-14-25 @ 07:00  --------------------------------------------------------  IN: 2025 mL / OUT: 33.5 mL / NET: 1991.5 mL        EXAM    General: NAD, resting in bed comfortably.  Cardiac: regular rate, warm and well perfused  Respiratory: Nonlabored respirations, normal cw expansion.  Abdomen: soft, nontender, nondistended, IR drain with SS output  Extremities: normal strength, FROM, no deformities    Assessment and Plan:   · Assessment	  89F PMH DM, CAD, HTN, HLD, and recently diagnosed Colon Ca. Patient was recently diagnosed with colon cancer at [facility] where she was admitted for a lower GI bleed and low Hg of 6.5, per son, patient had had blood in the stool for several months before admission. At [facility] patient had serval blood transfusions and had a CT scan showing  mural thickening and adjacent stranding of proximal sigmoid colon, suspicious of mural neoplasm, following colonoscopy showed  melanosis in colon,  fungating infiltrative polypoid and ulcerated partially obstructing large mass was near to sigmoid colon (per chart review) . Incidental left common femoral vein filling defect noted on CTAP imaging on this admission (6/10/25)concerning for DVT s/p IVC filter 6/11. S/p IR drainage of abdominal collection 6/12. Plan for surgical resection with ostomy Monday 6/16    Plan:  - OR planning Monday 6/16  - IV Abx: zosyn 6/10/25  - Pain control PRN  - Diet: clear liquids  - Activity: OOB  - DVT ppx: SCD's   - Wound care consult for ostomy marking today  - DVT prophylaxis   - Med and cards risk stratification documented 6/12    A team surgery [id number]

## 2025-06-15 LAB
ANION GAP SERPL CALC-SCNC: 17 MMOL/L — HIGH (ref 7–14)
BUN SERPL-MCNC: 10 MG/DL — SIGNIFICANT CHANGE UP (ref 7–23)
CALCIUM SERPL-MCNC: 8.4 MG/DL — SIGNIFICANT CHANGE UP (ref 8.4–10.5)
CHLORIDE SERPL-SCNC: 97 MMOL/L — LOW (ref 98–107)
CO2 SERPL-SCNC: 20 MMOL/L — LOW (ref 22–31)
CREAT SERPL-MCNC: 1.04 MG/DL — SIGNIFICANT CHANGE UP (ref 0.5–1.3)
CULTURE RESULTS: SIGNIFICANT CHANGE UP
CULTURE RESULTS: SIGNIFICANT CHANGE UP
EGFR: 50 ML/MIN/1.73M2 — LOW
EGFR: 50 ML/MIN/1.73M2 — LOW
GLUCOSE SERPL-MCNC: 313 MG/DL — HIGH (ref 70–99)
HCT VFR BLD CALC: 25.7 % — LOW (ref 34.5–45)
HCT VFR BLD CALC: 33.5 % — LOW (ref 34.5–45)
HGB BLD-MCNC: 10.2 G/DL — LOW (ref 11.5–15.5)
HGB BLD-MCNC: 7.4 G/DL — LOW (ref 11.5–15.5)
MAGNESIUM SERPL-MCNC: 2 MG/DL — SIGNIFICANT CHANGE UP (ref 1.6–2.6)
MCHC RBC-ENTMCNC: 21.2 PG — LOW (ref 27–34)
MCHC RBC-ENTMCNC: 21.5 PG — LOW (ref 27–34)
MCHC RBC-ENTMCNC: 28.8 G/DL — LOW (ref 32–36)
MCHC RBC-ENTMCNC: 30.4 G/DL — LOW (ref 32–36)
MCV RBC AUTO: 70.5 FL — LOW (ref 80–100)
MCV RBC AUTO: 73.6 FL — LOW (ref 80–100)
NRBC # BLD AUTO: 0 K/UL — SIGNIFICANT CHANGE UP (ref 0–0)
NRBC # BLD AUTO: 0 K/UL — SIGNIFICANT CHANGE UP (ref 0–0)
NRBC # FLD: 0 K/UL — SIGNIFICANT CHANGE UP (ref 0–0)
NRBC # FLD: 0 K/UL — SIGNIFICANT CHANGE UP (ref 0–0)
NRBC BLD AUTO-RTO: 0 /100 WBCS — SIGNIFICANT CHANGE UP (ref 0–0)
NRBC BLD AUTO-RTO: 0 /100 WBCS — SIGNIFICANT CHANGE UP (ref 0–0)
PHOSPHATE SERPL-MCNC: 3.1 MG/DL — SIGNIFICANT CHANGE UP (ref 2.5–4.5)
PLATELET # BLD AUTO: 408 K/UL — HIGH (ref 150–400)
PLATELET # BLD AUTO: 587 K/UL — HIGH (ref 150–400)
POTASSIUM SERPL-MCNC: 3.5 MMOL/L — SIGNIFICANT CHANGE UP (ref 3.5–5.3)
POTASSIUM SERPL-SCNC: 3.5 MMOL/L — SIGNIFICANT CHANGE UP (ref 3.5–5.3)
RBC # BLD: 3.49 M/UL — LOW (ref 3.8–5.2)
RBC # BLD: 4.75 M/UL — SIGNIFICANT CHANGE UP (ref 3.8–5.2)
RBC # FLD: 24.2 % — HIGH (ref 10.3–14.5)
RBC # FLD: 24.6 % — HIGH (ref 10.3–14.5)
SODIUM SERPL-SCNC: 134 MMOL/L — LOW (ref 135–145)
SPECIMEN SOURCE: SIGNIFICANT CHANGE UP
SPECIMEN SOURCE: SIGNIFICANT CHANGE UP
WBC # BLD: 5.52 K/UL — SIGNIFICANT CHANGE UP (ref 3.8–10.5)
WBC # BLD: 8.08 K/UL — SIGNIFICANT CHANGE UP (ref 3.8–10.5)
WBC # FLD AUTO: 5.52 K/UL — SIGNIFICANT CHANGE UP (ref 3.8–10.5)
WBC # FLD AUTO: 8.08 K/UL — SIGNIFICANT CHANGE UP (ref 3.8–10.5)

## 2025-06-15 PROCEDURE — 99232 SBSQ HOSP IP/OBS MODERATE 35: CPT

## 2025-06-15 PROCEDURE — 99233 SBSQ HOSP IP/OBS HIGH 50: CPT | Mod: 57

## 2025-06-15 RX ORDER — NEOMYCIN SULFATE 87.5 MG/5ML
500 SOLUTION ORAL EVERY 8 HOURS
Refills: 0 | Status: COMPLETED | OUTPATIENT
Start: 2025-06-15 | End: 2025-06-16

## 2025-06-15 RX ORDER — SALINE 7; 19 G/118ML; G/118ML
1 ENEMA RECTAL ONCE
Refills: 0 | Status: DISCONTINUED | OUTPATIENT
Start: 2025-06-16 | End: 2025-06-16

## 2025-06-15 RX ORDER — ONDANSETRON HCL/PF 4 MG/2 ML
4 VIAL (ML) INJECTION EVERY 6 HOURS
Refills: 0 | Status: DISCONTINUED | OUTPATIENT
Start: 2025-06-15 | End: 2025-06-20

## 2025-06-15 RX ORDER — POLYETHYLENE GLYCOL 3350 17 G/17G
17 POWDER, FOR SOLUTION ORAL
Refills: 0 | Status: COMPLETED | OUTPATIENT
Start: 2025-06-15 | End: 2025-06-15

## 2025-06-15 RX ORDER — METRONIDAZOLE 250 MG
250 TABLET ORAL EVERY 8 HOURS
Refills: 0 | Status: COMPLETED | OUTPATIENT
Start: 2025-06-15 | End: 2025-06-16

## 2025-06-15 RX ADMIN — POLYETHYLENE GLYCOL 3350 17 GRAM(S): 17 POWDER, FOR SOLUTION ORAL at 20:34

## 2025-06-15 RX ADMIN — Medication 650 MILLIGRAM(S): at 01:02

## 2025-06-15 RX ADMIN — METOPROLOL SUCCINATE 25 MILLIGRAM(S): 50 TABLET, EXTENDED RELEASE ORAL at 05:11

## 2025-06-15 RX ADMIN — POLYETHYLENE GLYCOL 3350 17 GRAM(S): 17 POWDER, FOR SOLUTION ORAL at 21:24

## 2025-06-15 RX ADMIN — Medication 650 MILLIGRAM(S): at 06:11

## 2025-06-15 RX ADMIN — Medication 25 GRAM(S): at 02:58

## 2025-06-15 RX ADMIN — NEOMYCIN SULFATE 500 MILLIGRAM(S): 87.5 SOLUTION ORAL at 20:34

## 2025-06-15 RX ADMIN — AMLODIPINE BESYLATE 5 MILLIGRAM(S): 10 TABLET ORAL at 05:11

## 2025-06-15 RX ADMIN — Medication 25 GRAM(S): at 12:27

## 2025-06-15 RX ADMIN — Medication 25 GRAM(S): at 20:34

## 2025-06-15 RX ADMIN — SERTRALINE 25 MILLIGRAM(S): 100 TABLET, FILM COATED ORAL at 12:28

## 2025-06-15 RX ADMIN — Medication 650 MILLIGRAM(S): at 05:11

## 2025-06-15 RX ADMIN — METOPROLOL SUCCINATE 25 MILLIGRAM(S): 50 TABLET, EXTENDED RELEASE ORAL at 18:27

## 2025-06-15 RX ADMIN — POLYETHYLENE GLYCOL 3350 17 GRAM(S): 17 POWDER, FOR SOLUTION ORAL at 18:08

## 2025-06-15 RX ADMIN — POLYETHYLENE GLYCOL 3350 17 GRAM(S): 17 POWDER, FOR SOLUTION ORAL at 22:46

## 2025-06-15 RX ADMIN — Medication 250 MILLIGRAM(S): at 21:23

## 2025-06-15 RX ADMIN — ENOXAPARIN SODIUM 40 MILLIGRAM(S): 100 INJECTION SUBCUTANEOUS at 00:03

## 2025-06-15 RX ADMIN — Medication 250 MILLIGRAM(S): at 12:31

## 2025-06-15 RX ADMIN — NEOMYCIN SULFATE 500 MILLIGRAM(S): 87.5 SOLUTION ORAL at 12:32

## 2025-06-15 RX ADMIN — POLYETHYLENE GLYCOL 3350 17 GRAM(S): 17 POWDER, FOR SOLUTION ORAL at 17:28

## 2025-06-15 RX ADMIN — Medication 650 MILLIGRAM(S): at 00:02

## 2025-06-15 RX ADMIN — POLYETHYLENE GLYCOL 3350 17 GRAM(S): 17 POWDER, FOR SOLUTION ORAL at 12:31

## 2025-06-15 RX ADMIN — POLYETHYLENE GLYCOL 3350 17 GRAM(S): 17 POWDER, FOR SOLUTION ORAL at 17:30

## 2025-06-15 RX ADMIN — Medication 6 MILLIGRAM(S): at 00:03

## 2025-06-15 NOTE — PROGRESS NOTE ADULT - ASSESSMENT
ERP prep today  OR tomorrow 89F PMH DM, CAD, HTN, HLD, and recently diagnosed Colon Ca. Patient was recently diagnosed with colon cancer at [facility] where she was admitted for a lower GI bleed and low Hg of 6.5, per son, patient had had blood in the stool for several months before admission. At [facility] patient had serval blood transfusions and had a CT scan showing  mural thickening and adjacent stranding of proximal sigmoid colon, suspicious of mural neoplasm, following colonoscopy showed  melanosis in colon,  fungating infiltrative polypoid and ulcerated partially obstructing large mass was near to sigmoid colon (per chart review) . Incidental left common femoral vein filling defect noted on CTAP imaging on this admission (6/10/25)concerning for DVT s/p IVC filter 6/11. S/p IR drainage of abdominal collection 6/12. Plan for surgical resection with ostomy Monday 6/16. IR cultures returned pansensitive E. coli    Plan:  - OR planning tomorrow  - ERP prep tonight (miralax, neomycin/metronidazole)  - IV Abx: zosyn 6/10/25  - Pain control PRN  - Activity: OOB  - DVT ppx: SCD's   - Wound care consult for ostomy marking today  - DVT prophylaxis   - Med and cards risk stratification documented 6/12    A team surgery   [id number]

## 2025-06-15 NOTE — PROGRESS NOTE ADULT - ASSESSMENT
89F w/ PMH of HTN, HLD, T2DM, CAD here for new dx of colonic mass likely colon CA admitted for fever, large RP mass, and possible LLE DVT. Cardiology consulted for preop cardiac eval for IR drain of the the retroperitoneal abscess.     -TTE w/ severe MR, +WMAs. euvolemic, no cp. neg trop and CKMB   -US w/ L above the knee DVT, s/p IVC filter   -s/p IR drain   -pending OR with colorectal sx   -cont abx per primary team   -outpt cardiology f/u     Flavio Harris MD [facility]  Attending Interventional Cardiologist, [facility].   [employer] on [employer] Team

## 2025-06-15 NOTE — PROGRESS NOTE ADULT - SUBJECTIVE AND OBJECTIVE BOX
Date of service: 06-15-25 @ 09:42    INTERVAL HPI/OVERNIGHT EVENTS:  Patient is a 89yFemale  no acute events    Vital Signs Last 24 Hrs  T(C): 36.5 (15 Brian 2025 08:59), Max: 36.7 (14 Jun 2025 21:08)  T(F): 97.7 (15 Brian 2025 08:59), Max: 98 (14 Jun 2025 21:08)  HR: 72 (15 Brian 2025 08:59) (68 - 81)  BP: 142/73 (15 Brian 2025 08:59) (139/68 - 151/61)  BP(mean): --  RR: 16 (15 Brian 2025 08:59) (16 - 18)  SpO2: 95% (15 Brian 2025 08:59) (95% - 99%)    Parameters below as of 15 Brian 2025 08:59  Patient On (Oxygen Delivery Method): room air      06-14 @ 07:01  -  06-15 @ 07:00  --------------------------------------------------------  IN: 480 mL / OUT: 10 mL / NET: 470 mL        PHYSICAL EXAM:  Constitutional: well developed, well nourished, NAD  Eyes: anicteric  ENMT: normal facies, symmetric  Neck: supple  Respiratory: CTA bilat  Cardiovascular: RRR  Gastrointestinal: abdomen soft, nontender, nondistended. No obvious masses. No peritonitis, drain in place  Extremities: FROM, warm  Neurological: intact, non-focal  Skin: no gross lesions  Lymph Nodes: no gross adenopathy  Musculoskeletal: equal strength bilateral upper and lower extremities  Psychiatric: oriented x 3; appropriate          LABS:                        7.4    5.52  )-----------( 408      ( 15 Brian 2025 05:56 )             25.7     06-15    134[L]  |  97[L]  |  10  ----------------------------<  313[H]  3.5   |  20[L]  |  1.04    Ca    8.4      15 Brian 2025 05:56  Phos  3.1     06-15  Mg     2.00     06-15        Urinalysis Basic - ( 15 Brian 2025 05:56 )    Color: x / Appearance: x / SG: x / pH: x  Gluc: 313 mg/dL / Ketone: x  / Bili: x / Urobili: x   Blood: x / Protein: x / Nitrite: x   Leuk Esterase: x / RBC: x / WBC x   Sq Epi: x / Non Sq Epi: x / Bacteria: x      Magnesium: 2.00 mg/dL (06-15 @ 05:56)  Phosphorus: 3.1 mg/dL (06-15 @ 05:56)       Date of service: 06-15-25 @ 09:42    INTERVAL HPI/OVERNIGHT EVENTS:  Patient is a 89yFemale, denies nausea vomiting overnight. No abdominal pain.   no acute events overnight    Vital Signs Last 24 Hrs  T(C): 36.5 (15 Brian 2025 08:59), Max: 36.7 (14 Jun 2025 21:08)  T(F): 97.7 (15 Brian 2025 08:59), Max: 98 (14 Jun 2025 21:08)  HR: 72 (15 Brian 2025 08:59) (68 - 81)  BP: 142/73 (15 Brian 2025 08:59) (139/68 - 151/61)  BP(mean): --  RR: 16 (15 Brian 2025 08:59) (16 - 18)  SpO2: 95% (15 Brian 2025 08:59) (95% - 99%)    Parameters below as of 15 Brian 2025 08:59  Patient On (Oxygen Delivery Method): room air      06-14 @ 07:01  -  06-15 @ 07:00  --------------------------------------------------------  IN: 480 mL / OUT: 10 mL / NET: 470 mL        PHYSICAL EXAM:  Constitutional: well developed, well nourished, NAD  Eyes: anicteric  ENMT: normal facies, symmetric  Neck: supple  Respiratory: CTA bilat  Cardiovascular: RRR  Gastrointestinal: abdomen soft, nontender, nondistended. No obvious masses. No peritonitis, drain in place w/ serosang output  Extremities: FROM, warm  Neurological: intact, non-focal  Skin: no gross lesions  Lymph Nodes: no gross adenopathy  Musculoskeletal: equal strength bilateral upper and lower extremities  Psychiatric: oriented x 3; appropriate          LABS:                        7.4    5.52  )-----------( 408      ( 15 Brian 2025 05:56 )             25.7     06-15    134[L]  |  97[L]  |  10  ----------------------------<  313[H]  3.5   |  20[L]  |  1.04    Ca    8.4      15 Brian 2025 05:56  Phos  3.1     06-15  Mg     2.00     06-15        Urinalysis Basic - ( 15 Brian 2025 05:56 )    Color: x / Appearance: x / SG: x / pH: x  Gluc: 313 mg/dL / Ketone: x  / Bili: x / Urobili: x   Blood: x / Protein: x / Nitrite: x   Leuk Esterase: x / RBC: x / WBC x   Sq Epi: x / Non Sq Epi: x / Bacteria: x      Magnesium: 2.00 mg/dL (06-15 @ 05:56)  Phosphorus: 3.1 mg/dL (06-15 @ 05:56)

## 2025-06-15 NOTE — PROGRESS NOTE ADULT - SUBJECTIVE AND OBJECTIVE BOX
[facility] Attending Follow-up Note     Patient seen and examined at bedside. 6/15/2025    Overnight Events:     events noted     REVIEW OF SYSTEMS:  Constitutional:     [x ] negative [ ] fevers [ ] chills [ ] weight loss [ ] weight gain  HEENT:                  [x ] negative [ ] dry eyes [ ] eye irritation [ ] postnasal drip [ ] nasal congestion  CV:                         [ x] negative  [ ] chest pain [ ] orthopnea [ ] palpitations [ ] murmur  Resp:                     [ x] negative [ ] cough [ ] shortness of breath [ ] dyspnea [ ] wheezing [ ] sputum [ ]hemoptysis  GI:                          [ x] negative [ ] nausea [ ] vomiting [ ] diarrhea [ ] constipation [ ] abd pain [ ] dysphagia   :                        [ x] negative [ ] dysuria [ ] nocturia [ ] hematuria [ ] increased urinary frequency  Musculoskeletal: [ x] negative [ ] back pain [ ] myalgias [ ] arthralgias [ ] fracture  Skin:                       [ x] negative [ ] rash [ ] itch  Neurological:        [x ] negative [ ] headache [ ] dizziness [ ] syncope [ ] weakness [ ] numbness  Psychiatric:           [ x] negative [ ] anxiety [ ] depression  Endocrine:            [ x] negative [ ] diabetes [ ] thyroid problem  Heme/Lymph:      [ x] negative [ ] anemia [ ] bleeding problem  Allergic/Immune: [ x] negative [ ] itchy eyes [ ] nasal discharge [ ] hives [ ] angioedema    [ x] All other systems negative  [ ] Unable to assess ROS due to    Current Meds:      PAST MEDICAL & SURGICAL HISTORY:  Hypertension      CAD (coronary artery disease)          Vitals:  T(F): --  HR: --  BP: --  RR: --  SpO2: --  I&O's Summary    20 Jun 2025 07:01  -  21 Jun 2025 07:00  --------------------------------------------------------  IN: 490 mL / OUT: 250 mL / NET: 240 mL        Physical Exam:  Appearance: No acute distress  HENT: No JVD   Cardiovascular: RRR, S1/S2, no murmurs  Respiratory: CTABL  Gastrointestinal: soft, NT ND, +BS  Musculoskeletal: No clubbing, no edema   Neurologic: Non-focal  Skin: No rashes, ecchymoses, or cyanosis                          7.8    9.54  )-----------( 436      ( 20 Jun 2025 05:46 )             26.7     06-20    137  |  102  |  6[L]  ----------------------------<  117[H]  4.0   |  27  |  0.79    Ca    8.7      20 Jun 2025 05:46  Phos  2.6     06-20  Mg     2.10     06-20                    Cardiovascular Testings:

## 2025-06-16 ENCOUNTER — APPOINTMENT (OUTPATIENT)
Dept: COLORECTAL SURGERY | Facility: HOSPITAL | Age: 89
End: 2025-06-16
Payer: MEDICARE

## 2025-06-16 ENCOUNTER — TRANSCRIPTION ENCOUNTER (OUTPATIENT)
Age: 89
End: 2025-06-16

## 2025-06-16 LAB
ANION GAP SERPL CALC-SCNC: 20 MMOL/L — HIGH (ref 7–14)
APTT BLD: 36.3 SEC — SIGNIFICANT CHANGE UP (ref 26.1–36.8)
BLD GP AB SCN SERPL QL: NEGATIVE — SIGNIFICANT CHANGE UP
BUN SERPL-MCNC: 10 MG/DL — SIGNIFICANT CHANGE UP (ref 7–23)
CALCIUM SERPL-MCNC: 8.9 MG/DL — SIGNIFICANT CHANGE UP (ref 8.4–10.5)
CHLORIDE SERPL-SCNC: 97 MMOL/L — LOW (ref 98–107)
CO2 SERPL-SCNC: 13 MMOL/L — LOW (ref 22–31)
CREAT SERPL-MCNC: 0.92 MG/DL — SIGNIFICANT CHANGE UP (ref 0.5–1.3)
EGFR: 58 ML/MIN/1.73M2 — LOW
EGFR: 58 ML/MIN/1.73M2 — LOW
GLUCOSE SERPL-MCNC: 247 MG/DL — HIGH (ref 70–99)
HCT VFR BLD CALC: 36.5 % — SIGNIFICANT CHANGE UP (ref 34.5–45)
HGB BLD-MCNC: 10.2 G/DL — LOW (ref 11.5–15.5)
INR BLD: 0.97 RATIO — SIGNIFICANT CHANGE UP (ref 0.85–1.16)
MAGNESIUM SERPL-MCNC: 2 MG/DL — SIGNIFICANT CHANGE UP (ref 1.6–2.6)
MCHC RBC-ENTMCNC: 21.3 PG — LOW (ref 27–34)
MCHC RBC-ENTMCNC: 27.9 G/DL — LOW (ref 32–36)
MCV RBC AUTO: 76.2 FL — LOW (ref 80–100)
NRBC # BLD AUTO: 0 K/UL — SIGNIFICANT CHANGE UP (ref 0–0)
NRBC # FLD: 0 K/UL — SIGNIFICANT CHANGE UP (ref 0–0)
NRBC BLD AUTO-RTO: 0 /100 WBCS — SIGNIFICANT CHANGE UP (ref 0–0)
PHOSPHATE SERPL-MCNC: 2.9 MG/DL — SIGNIFICANT CHANGE UP (ref 2.5–4.5)
PLATELET # BLD AUTO: 482 K/UL — HIGH (ref 150–400)
POTASSIUM SERPL-MCNC: 4.3 MMOL/L — SIGNIFICANT CHANGE UP (ref 3.5–5.3)
POTASSIUM SERPL-SCNC: 4.3 MMOL/L — SIGNIFICANT CHANGE UP (ref 3.5–5.3)
PROTHROM AB SERPL-ACNC: 11.6 SEC — SIGNIFICANT CHANGE UP (ref 9.9–13.4)
RBC # BLD: 4.79 M/UL — SIGNIFICANT CHANGE UP (ref 3.8–5.2)
RBC # FLD: 24.6 % — HIGH (ref 10.3–14.5)
RH IG SCN BLD-IMP: POSITIVE — SIGNIFICANT CHANGE UP
SODIUM SERPL-SCNC: 130 MMOL/L — LOW (ref 135–145)
WBC # BLD: 10.92 K/UL — HIGH (ref 3.8–10.5)
WBC # FLD AUTO: 10.92 K/UL — HIGH (ref 3.8–10.5)

## 2025-06-16 PROCEDURE — 88304 TISSUE EXAM BY PATHOLOGIST: CPT | Mod: 26

## 2025-06-16 PROCEDURE — 88305 TISSUE EXAM BY PATHOLOGIST: CPT | Mod: 26

## 2025-06-16 PROCEDURE — 44145 PARTIAL REMOVAL OF COLON: CPT

## 2025-06-16 PROCEDURE — 88309 TISSUE EXAM BY PATHOLOGIST: CPT | Mod: 26

## 2025-06-16 PROCEDURE — 88341 IMHCHEM/IMCYTCHM EA ADD ANTB: CPT | Mod: 26

## 2025-06-16 PROCEDURE — 44120 REMOVAL OF SMALL INTESTINE: CPT

## 2025-06-16 PROCEDURE — 99232 SBSQ HOSP IP/OBS MODERATE 35: CPT

## 2025-06-16 PROCEDURE — 88381 MICRODISSECTION MANUAL: CPT | Mod: 26

## 2025-06-16 PROCEDURE — 45330 DIAGNOSTIC SIGMOIDOSCOPY: CPT | Mod: 59

## 2025-06-16 PROCEDURE — 88342 IMHCHEM/IMCYTCHM 1ST ANTB: CPT | Mod: 26

## 2025-06-16 DEVICE — STAPLER ECHELON CONTOUR RELOAD GST 6/BX GRN: Type: IMPLANTABLE DEVICE | Status: FUNCTIONAL

## 2025-06-16 DEVICE — STAPLER COVIDIEN GIA 80-3.0MM PURPLE: Type: IMPLANTABLE DEVICE | Status: FUNCTIONAL

## 2025-06-16 DEVICE — LIGATING CLIPS WECK HORIZON LARGE (ORANGE) 24: Type: IMPLANTABLE DEVICE | Status: FUNCTIONAL

## 2025-06-16 DEVICE — STAPLER COVIDIEN PURSTRING 65MM: Type: IMPLANTABLE DEVICE | Status: FUNCTIONAL

## 2025-06-16 DEVICE — SURGICEL POWDER 3 GRAMS: Type: IMPLANTABLE DEVICE | Status: FUNCTIONAL

## 2025-06-16 DEVICE — STAPLER COVIDIEN TA 60 BLUE: Type: IMPLANTABLE DEVICE | Status: FUNCTIONAL

## 2025-06-16 DEVICE — STAPLER ECHELON CONTOUR CURVED CUTTER 40MM WITH (GREEN) RELOAD: Type: IMPLANTABLE DEVICE | Status: FUNCTIONAL

## 2025-06-16 DEVICE — LIGATING CLIPS WECK HORIZON MEDIUM (BLUE) 24: Type: IMPLANTABLE DEVICE | Status: FUNCTIONAL

## 2025-06-16 DEVICE — STAPLER COVIDIEN CIRCULAR TRI-STAPLE 28MM BLACK MEDIUM/THICK XL: Type: IMPLANTABLE DEVICE | Status: FUNCTIONAL

## 2025-06-16 DEVICE — STAPLER COVIDIEN GIA 80-3.0MM PURPLE RELOAD: Type: IMPLANTABLE DEVICE | Status: FUNCTIONAL

## 2025-06-16 RX ORDER — OXYCODONE HYDROCHLORIDE 30 MG/1
2.5 TABLET ORAL EVERY 4 HOURS
Refills: 0 | Status: DISCONTINUED | OUTPATIENT
Start: 2025-06-16 | End: 2025-06-20

## 2025-06-16 RX ORDER — HYDROMORPHONE/SOD CHLOR,ISO/PF 2 MG/10 ML
0.25 SYRINGE (ML) INJECTION
Refills: 0 | Status: DISCONTINUED | OUTPATIENT
Start: 2025-06-16 | End: 2025-06-16

## 2025-06-16 RX ORDER — SODIUM CHLORIDE 9 G/1000ML
1000 INJECTION, SOLUTION INTRAVENOUS
Refills: 0 | Status: DISCONTINUED | OUTPATIENT
Start: 2025-06-16 | End: 2025-06-18

## 2025-06-16 RX ORDER — OXYCODONE HYDROCHLORIDE 30 MG/1
5 TABLET ORAL EVERY 6 HOURS
Refills: 0 | Status: DISCONTINUED | OUTPATIENT
Start: 2025-06-16 | End: 2025-06-20

## 2025-06-16 RX ORDER — ONDANSETRON HCL/PF 4 MG/2 ML
4 VIAL (ML) INJECTION ONCE
Refills: 0 | Status: DISCONTINUED | OUTPATIENT
Start: 2025-06-16 | End: 2025-06-16

## 2025-06-16 RX ORDER — SODIUM CHLORIDE 9 G/1000ML
1000 INJECTION, SOLUTION INTRAVENOUS
Refills: 0 | Status: DISCONTINUED | OUTPATIENT
Start: 2025-06-16 | End: 2025-06-16

## 2025-06-16 RX ORDER — ACETAMINOPHEN 500 MG/5ML
1000 LIQUID (ML) ORAL EVERY 6 HOURS
Refills: 0 | Status: COMPLETED | OUTPATIENT
Start: 2025-06-16 | End: 2025-06-17

## 2025-06-16 RX ORDER — FENTANYL CITRATE-0.9 % NACL/PF 100MCG/2ML
50 SYRINGE (ML) INTRAVENOUS
Refills: 0 | Status: DISCONTINUED | OUTPATIENT
Start: 2025-06-16 | End: 2025-06-16

## 2025-06-16 RX ADMIN — ENOXAPARIN SODIUM 40 MILLIGRAM(S): 100 INJECTION SUBCUTANEOUS at 00:46

## 2025-06-16 RX ADMIN — Medication 650 MILLIGRAM(S): at 00:14

## 2025-06-16 RX ADMIN — Medication 1000 MILLIGRAM(S): at 22:00

## 2025-06-16 RX ADMIN — Medication 25 GRAM(S): at 19:31

## 2025-06-16 RX ADMIN — Medication 25 GRAM(S): at 04:07

## 2025-06-16 RX ADMIN — Medication 4 MILLIGRAM(S): at 00:14

## 2025-06-16 RX ADMIN — Medication 650 MILLIGRAM(S): at 06:08

## 2025-06-16 RX ADMIN — Medication 6 MILLIGRAM(S): at 00:13

## 2025-06-16 RX ADMIN — Medication 650 MILLIGRAM(S): at 01:14

## 2025-06-16 RX ADMIN — Medication 650 MILLIGRAM(S): at 07:02

## 2025-06-16 RX ADMIN — OXYCODONE HYDROCHLORIDE 2.5 MILLIGRAM(S): 30 TABLET ORAL at 20:46

## 2025-06-16 RX ADMIN — Medication 250 MILLIGRAM(S): at 04:08

## 2025-06-16 RX ADMIN — NEOMYCIN SULFATE 500 MILLIGRAM(S): 87.5 SOLUTION ORAL at 04:09

## 2025-06-16 RX ADMIN — ENOXAPARIN SODIUM 40 MILLIGRAM(S): 100 INJECTION SUBCUTANEOUS at 23:27

## 2025-06-16 RX ADMIN — SODIUM CHLORIDE 100 MILLILITER(S): 9 INJECTION, SOLUTION INTRAVENOUS at 07:45

## 2025-06-16 RX ADMIN — Medication 400 MILLIGRAM(S): at 21:24

## 2025-06-16 RX ADMIN — OXYCODONE HYDROCHLORIDE 2.5 MILLIGRAM(S): 30 TABLET ORAL at 21:29

## 2025-06-16 NOTE — CHART NOTE - NSCHARTNOTEFT_GEN_A_CORE
89F PMH DM, CAD, HTN, HLD, LGIB 2/2 recently discovered Colon Ca presenting today for diagnostic lap converted to ex-lap  LAR with rectosigmoid and  small bowel resection. Post op course uncomplicated and pt's pain controlled with stable hemodynamics. Called to bedside by RN for assistance removing Lt radial nichole. Hematoma formation with swelling and ecchymosis noted at insertion site. Nichole removed, pressure held x 15 minutes with good hemostasis. Pressure dressing applied. LUE elevated and ice applied.     Pt admits tenderness at site, with  mild paresthesia at distal fingertips 1-5. No motor impairment.     Lt radial and ulnar pulses intact, +cap refill distally, motor intact and sensory intact.    S/W  A team night resident Mel and advised to please monitor site. Pt is hemodynamically stable and meets PACU discharge criteria for transfer.

## 2025-06-16 NOTE — BRIEF OPERATIVE NOTE - OPERATION/FINDINGS
Diagnostic Laparoscopy converted to Exploratory Laparotomy, Enbloc Resection of Rectosigmoid (LAR) and small bowel resection with Stapled small bowel anastomosis and Colorectal EEA with 28 EEA, LLQ and pelvic drain placement. L ureter identified.

## 2025-06-16 NOTE — DIETITIAN INITIAL EVALUATION ADULT - NS FNS DIET ORDER
Diet, NPO:      Special Instructions for Nursing:  NPO 2 hours prior to surgery (06-16-25 @ 06:16)   NPO

## 2025-06-16 NOTE — DIETITIAN INITIAL EVALUATION ADULT - PERTINENT MEDS FT
MEDICATIONS  (STANDING):  acetaminophen     Tablet .. 650 milliGRAM(s) Oral every 6 hours  amLODIPine   Tablet 5 milliGRAM(s) Oral daily  atorvastatin 40 milliGRAM(s) Oral at bedtime  enoxaparin Injectable 40 milliGRAM(s) SubCutaneous every 24 hours  lactated ringers. 1000 milliLiter(s) (100 mL/Hr) IV Continuous <Continuous>  metoprolol tartrate 25 milliGRAM(s) Oral two times a day  piperacillin/tazobactam IVPB.. 3.375 Gram(s) IV Intermittent every 8 hours  sertraline 25 milliGRAM(s) Oral daily    MEDICATIONS  (PRN):  melatonin 6 milliGRAM(s) Oral at bedtime PRN Insomnia  ondansetron   Disintegrating Tablet 4 milliGRAM(s) Oral every 6 hours PRN Nausea and/or Vomiting   atorvastatin  lactated ringers IV Continuous   piperacillin/tazobactam IV  ondansetron Disintegrating Tablet PRN

## 2025-06-16 NOTE — BRIEF OPERATIVE NOTE - NSICDXBRIEFPROCEDURE_GEN_ALL_CORE_FT
PROCEDURES:  Exploratory laparotomy 16-Jun-2025 17:12:41  Sudarshan Ramesh  Open low anterior resection of colon 16-Jun-2025 17:12:52  Sudarshan Ramesh  Small bowel resection 16-Jun-2025 17:12:58  Sudarshan Ramesh  Drainage of abdominal abscess 16-Jun-2025 17:13:10  Sudarshan Ramesh   show

## 2025-06-16 NOTE — DIETITIAN NUTRITION RISK NOTIFICATION - ADDITIONAL COMMENTS/DIETITIAN RECOMMENDATIONS
Please see Dietitian Initial Assessment for complete recommendations.  Trina Arredondo, VERONA, CDN #[id number]  Also available on [employer] Teams

## 2025-06-16 NOTE — DIETITIAN INITIAL EVALUATION ADULT - NUTRITION DIAGNOSIS
Price (Do Not Change): 0.00 yes... Instructions: This plan will send the code FBSD to the PM system.  DO NOT or CHANGE the price. Detail Level: Simple

## 2025-06-16 NOTE — CHART NOTE - NSCHARTNOTEFT_GEN_A_CORE
General Surgery Post op Check    Pt seen and examined without complaints. Pain is controlled. Denies SOB/CP/N/V.     Vital Signs Last 24 Hrs  T(C): 36.6 (16 Jun 2025 18:00), Max: 37.1 (16 Jun 2025 06:04)  T(F): 97.9 (16 Jun 2025 18:00), Max: 98.7 (16 Jun 2025 06:04)  HR: 82 (16 Jun 2025 19:45) (55 - 100)  BP: 138/65 (16 Jun 2025 19:45) (110/61 - 158/71)  BP(mean): 87 (16 Jun 2025 19:45) (70 - 87)  RR: 15 (16 Jun 2025 19:45) (12 - 22)  SpO2: 96% (16 Jun 2025 19:45) (94% - 100%)    Parameters below as of 16 Jun 2025 19:00  Patient On (Oxygen Delivery Method): room air        I&O's Summary    15 Brian 2025 07:01  -  16 Jun 2025 07:00  --------------------------------------------------------  IN: 0 mL / OUT: 5 mL / NET: -5 mL    16 Jun 2025 07:01  -  16 Jun 2025 20:54  --------------------------------------------------------  IN: 645 mL / OUT: 445 mL / NET: 200 mL        Physical Exam  Gen: NAD, A&Ox3  Pulm: No respiratory distress, no subcostal retractions  CV: RRR, no JVD  Abd: Soft, NT, ND, prevena in place holding suction   Drains: ARMIN x 1 LLQ SS   Extremities:  FROM, warm and well perfused, equal bilateral muscle strength      A/P: 89y Female s/p open LAP, small bowel resection, drainage of abdominal abscess.     Plan   -DVT prophylaxis w/ SCD.  Encouraged OOB  -Strict I&O's  -Monitor ARMIN drain output  -Analgesia and antiemetics as needed  -zosyn   -CLD/IVF

## 2025-06-16 NOTE — DIETITIAN INITIAL EVALUATION ADULT - ADD RECOMMEND
1) As medically feasible, recommend advance to Kosher consistent carbohydrate clear liquid diet; with eventual progression to Kosher puree low fiber diet.   2) RDN remains available for consult to provide ostomy nutrition education.   3) Obtain weekly weights via tared bed scale.

## 2025-06-16 NOTE — PROGRESS NOTE ADULT - ASSESSMENT
89F w/ PMH of HTN, HLD, T2DM, CAD here for new dx of colonic mass likely colon CA admitted for fever, large RP mass, and possible LLE DVT. Cardiology consulted for preop cardiac eval for IR drain of the the retroperitoneal abscess.     -TTE w/ severe MR, +WMAs. euvolemic, no cp. neg trop and CKMB   -US w/ L above the knee DVT, s/p IVC filter   -s/p IR drain   -pending OR with colorectal sx   -cont abx per primary team   -outpt cardiology f/u     Flavio Harris MD [facility]  Attending Interventional Cardiologist, [facility]-NS/GENARO.   Avaliable on [employer] Team

## 2025-06-16 NOTE — PROGRESS NOTE ADULT - ASSESSMENT
89F PMH DM, CAD, HTN, HLD, and recently diagnosed Colon Ca. Patient was recently diagnosed with colon cancer at [facility] where she was admitted for a lower GI bleed and low Hg of 6.5, per son, patient had blood in the stool for several months before admission. At [facility] patient had serval blood transfusions and had a CT scan showing  mural thickening and adjacent stranding of proximal sigmoid colon, suspicious of mural neoplasm, following colonoscopy showed  melanosis in colon,  fungating infiltrative polypoid and ulcerated partially obstructing large mass was near to sigmoid colon (per chart review) . Incidental left common femoral vein filling defect noted on CTAP imaging on this admission (6/10/25)concerning for DVT s/p IVC filter 6/11. S/p IR drainage of abdominal collection 6/12. Plan for surgical resection with ostomy Monday 6/16. IR cultures returned pansensitive E. coli    Plan:  - OR  today for surgical resection with ostomy   - NPO/IVF  - ERP prep last night (miralax, neomycin/metronidazole) - bowel movement now clear  - strict I's/ O's  - IV Abx: zosyn 6/10/25 for pan sensitive E Coli from IR Culture  - Pain control PRN  - Activity: OOB  - DVT ppx: SCD's   - Wound care consult for ostomy marking 6/15  - DVT prophylaxis   - Med and cards risk stratification documented 6/12    A team surgery   [id number]

## 2025-06-16 NOTE — DIETITIAN INITIAL EVALUATION ADULT - OTHER CALCULATIONS
Dosing weight (6/11) 146.6 lbs / 66.5 kg.  No weight history available via chart. Family unable to report usual body weight however endorses unintentional weight loss with clothes fitting looser.  Ideal Body Weight: 110 lbs / 50 kg +/-10%

## 2025-06-16 NOTE — PRE-OP CHECKLIST - ANTIBIOTIC
Sonido Guillen calling to report that she was to update provider of patients walking progress. Sonido Guillen states that the patients walking has gotten worse to the point that she is needing assistance to stand, while standing and transferring to chair.  Sonido Guillen further states that she feels that the patient would benefit from some PT    Please advise
yes
yes

## 2025-06-16 NOTE — PROGRESS NOTE ADULT - SUBJECTIVE AND OBJECTIVE BOX
A Team Colorectal Surgery Progress Note    S: Pt seen and examined with team on morning rounds. Patient receiving bowel prep yesterday/overnight - tolerated. Bowel movements now clear. Currently on commode. Patient NPO/IVF. Feels ok, slightly weak.       Vital Signs Last 24 Hrs  T(C): 37.1 (16 Jun 2025 06:04), Max: 37.1 (16 Jun 2025 06:04)  T(F): 98.7 (16 Jun 2025 06:04), Max: 98.7 (16 Jun 2025 06:04)  HR: 69 (16 Jun 2025 06:04) (65 - 80)  BP: 111/60 (16 Jun 2025 06:04) (111/60 - 158/71)  BP(mean): --  RR: 18 (16 Jun 2025 06:04) (16 - 18)  SpO2: 97% (16 Jun 2025 06:04) (95% - 98%)    Parameters below as of 16 Jun 2025 06:04  Patient On (Oxygen Delivery Method): room air        I&O's Summary    15 Brian 2025 07:01  -  16 Jun 2025 07:00  --------------------------------------------------------  IN: 0 mL / OUT: 0 mL / NET: 0 mL      I&O's Detail    15 Brian 2025 07:01  -  16 Jun 2025 07:00  --------------------------------------------------------  IN:  Total IN: 0 mL    OUT:    Drain (mL): 0 mL  Total OUT: 0 mL    Total NET: 0 mL          General Appearance: Appears well, NAD  Chest: Breathing comfortably on RA.    CV: S1, S2 @ 69  Abdomen: Patient on commode. Bowel movement currently clear.   Extremities: Moves all extremities.    LABS:                        10.2   8.08  )-----------( 587      ( 15 Brian 2025 18:06 )             33.5     06-16    130[L]  |  97[L]  |  10  ----------------------------<  247[H]  4.3   |  13[L]  |  0.92    Ca    8.9      16 Jun 2025 05:32  Phos  2.9     06-16  Mg     2.00     06-16      PT/INR - ( 16 Jun 2025 05:32 )   PT: 11.6 sec;   INR: 0.97 ratio         PTT - ( 16 Jun 2025 05:32 )  PTT:36.3 sec

## 2025-06-16 NOTE — PROGRESS NOTE ADULT - SUBJECTIVE AND OBJECTIVE BOX
[facility] Attending Follow-up Note     Patient seen and examined at bedside. 6/16/2025     Overnight Events:     events noted     REVIEW OF SYSTEMS:  Constitutional:     [x ] negative [ ] fevers [ ] chills [ ] weight loss [ ] weight gain  HEENT:                  [x ] negative [ ] dry eyes [ ] eye irritation [ ] postnasal drip [ ] nasal congestion  CV:                         [ x] negative  [ ] chest pain [ ] orthopnea [ ] palpitations [ ] murmur  Resp:                     [ x] negative [ ] cough [ ] shortness of breath [ ] dyspnea [ ] wheezing [ ] sputum [ ]hemoptysis  GI:                          [ x] negative [ ] nausea [ ] vomiting [ ] diarrhea [ ] constipation [ ] abd pain [ ] dysphagia   :                        [ x] negative [ ] dysuria [ ] nocturia [ ] hematuria [ ] increased urinary frequency  Musculoskeletal: [ x] negative [ ] back pain [ ] myalgias [ ] arthralgias [ ] fracture  Skin:                       [ x] negative [ ] rash [ ] itch  Neurological:        [x ] negative [ ] headache [ ] dizziness [ ] syncope [ ] weakness [ ] numbness  Psychiatric:           [ x] negative [ ] anxiety [ ] depression  Endocrine:            [ x] negative [ ] diabetes [ ] thyroid problem  Heme/Lymph:      [ x] negative [ ] anemia [ ] bleeding problem  Allergic/Immune: [ x] negative [ ] itchy eyes [ ] nasal discharge [ ] hives [ ] angioedema    [ x] All other systems negative  [ ] Unable to assess ROS due to    Current Meds:      PAST MEDICAL & SURGICAL HISTORY:  Hypertension      CAD (coronary artery disease)          Vitals:  T(F): --  HR: --  BP: --  RR: --  SpO2: --  I&O's Summary    20 Jun 2025 07:01  -  21 Jun 2025 07:00  --------------------------------------------------------  IN: 490 mL / OUT: 250 mL / NET: 240 mL        Physical Exam:  Appearance: No acute distress  HENT: No JVD   Cardiovascular: RRR, S1/S2, no murmurs  Respiratory: CTABL  Gastrointestinal: soft, NT ND, +BS  Musculoskeletal: No clubbing, no edema   Neurologic: Non-focal  Skin: No rashes, ecchymoses, or cyanosis                          7.8    9.54  )-----------( 436      ( 20 Jun 2025 05:46 )             26.7     06-20    137  |  102  |  6[L]  ----------------------------<  117[H]  4.0   |  27  |  0.79    Ca    8.7      20 Jun 2025 05:46  Phos  2.6     06-20  Mg     2.10     06-20                    Cardiovascular Testings:

## 2025-06-16 NOTE — DIETITIAN INITIAL EVALUATION ADULT - OTHER INFO
Per chart, pt is 89 year old Malagasy speaking female PMH type 2 DM, CAD, HTN, HLD, recently diagnosed colon cancer. CTAP (6/10) concerning for DVT s/p IVC filter (6/11). S/p IR drainage of abdominal collection (6/12). Pending surgical resection with ostomy today. Colorectal Surgery and Surgery following.    Spoke with family at bedside. Confirms NKFA. Pt with generally poor appetite/PO intake, consuming small portions of Kosher puree food PTA. Denies prior use of nutrition shakes. History of type 2 DM, HbA1c (6/13) 5.9%; unknown medications PTA.    Pt has remained on an inadequate diet x6 days. S/p bowel prep pending OR/ostomy today. Continues on IVF. Labs notable for hyponatremia. Fingersticks WDL.

## 2025-06-16 NOTE — DIETITIAN INITIAL EVALUATION ADULT - PERTINENT LABORATORY DATA
06-16    130[L]  |  97[L]  |  10  ----------------------------<  247[H]  4.3   |  13[L]  |  0.92    Ca    8.9      16 Jun 2025 05:32  Phos  2.9     06-16  Mg     2.00     06-16    POCT Blood Glucose.: 92 mg/dL (06-15-25 @ 11:44)  A1C with Estimated Average Glucose Result: 5.9 % (06-13-25 @ 03:51)  A1C with Estimated Average Glucose Result: 5.5 % (06-12-25 @ 04:35)   (6/16) Na 130[L], BUN 10, Cr 0.92, [H], K+ 4.3, Phos 2.9, Mg 2.00  (6/13) HbA1c 5.9%[H]  POCT: (6/15) 92-95

## 2025-06-17 LAB
ANION GAP SERPL CALC-SCNC: 13 MMOL/L — SIGNIFICANT CHANGE UP (ref 7–14)
BUN SERPL-MCNC: 6 MG/DL — LOW (ref 7–23)
CALCIUM SERPL-MCNC: 8.1 MG/DL — LOW (ref 8.4–10.5)
CHLORIDE SERPL-SCNC: 102 MMOL/L — SIGNIFICANT CHANGE UP (ref 98–107)
CO2 SERPL-SCNC: 22 MMOL/L — SIGNIFICANT CHANGE UP (ref 22–31)
CREAT SERPL-MCNC: 0.77 MG/DL — SIGNIFICANT CHANGE UP (ref 0.5–1.3)
EGFR: 72 ML/MIN/1.73M2 — SIGNIFICANT CHANGE UP
EGFR: 72 ML/MIN/1.73M2 — SIGNIFICANT CHANGE UP
GLUCOSE SERPL-MCNC: 145 MG/DL — HIGH (ref 70–99)
HCT VFR BLD CALC: 26.7 % — LOW (ref 34.5–45)
HCT VFR BLD CALC: 28.2 % — LOW (ref 34.5–45)
HGB BLD-MCNC: 7.9 G/DL — LOW (ref 11.5–15.5)
HGB BLD-MCNC: 8.6 G/DL — LOW (ref 11.5–15.5)
MAGNESIUM SERPL-MCNC: 1.7 MG/DL — SIGNIFICANT CHANGE UP (ref 1.6–2.6)
MCHC RBC-ENTMCNC: 21.5 PG — LOW (ref 27–34)
MCHC RBC-ENTMCNC: 21.8 PG — LOW (ref 27–34)
MCHC RBC-ENTMCNC: 29.6 G/DL — LOW (ref 32–36)
MCHC RBC-ENTMCNC: 30.5 G/DL — LOW (ref 32–36)
MCV RBC AUTO: 71.4 FL — LOW (ref 80–100)
MCV RBC AUTO: 72.6 FL — LOW (ref 80–100)
NRBC # BLD AUTO: 0 K/UL — SIGNIFICANT CHANGE UP (ref 0–0)
NRBC # BLD AUTO: 0 K/UL — SIGNIFICANT CHANGE UP (ref 0–0)
NRBC # FLD: 0 K/UL — SIGNIFICANT CHANGE UP (ref 0–0)
NRBC # FLD: 0 K/UL — SIGNIFICANT CHANGE UP (ref 0–0)
NRBC BLD AUTO-RTO: 0 /100 WBCS — SIGNIFICANT CHANGE UP (ref 0–0)
NRBC BLD AUTO-RTO: 0 /100 WBCS — SIGNIFICANT CHANGE UP (ref 0–0)
PHOSPHATE SERPL-MCNC: 3.1 MG/DL — SIGNIFICANT CHANGE UP (ref 2.5–4.5)
PLATELET # BLD AUTO: 409 K/UL — HIGH (ref 150–400)
PLATELET # BLD AUTO: 443 K/UL — HIGH (ref 150–400)
POTASSIUM SERPL-MCNC: 3.5 MMOL/L — SIGNIFICANT CHANGE UP (ref 3.5–5.3)
POTASSIUM SERPL-SCNC: 3.5 MMOL/L — SIGNIFICANT CHANGE UP (ref 3.5–5.3)
RBC # BLD: 3.68 M/UL — LOW (ref 3.8–5.2)
RBC # BLD: 3.95 M/UL — SIGNIFICANT CHANGE UP (ref 3.8–5.2)
RBC # FLD: 24.3 % — HIGH (ref 10.3–14.5)
RBC # FLD: 25.2 % — HIGH (ref 10.3–14.5)
SODIUM SERPL-SCNC: 137 MMOL/L — SIGNIFICANT CHANGE UP (ref 135–145)
WBC # BLD: 14.35 K/UL — HIGH (ref 3.8–10.5)
WBC # BLD: 18.54 K/UL — HIGH (ref 3.8–10.5)
WBC # FLD AUTO: 14.35 K/UL — HIGH (ref 3.8–10.5)
WBC # FLD AUTO: 18.54 K/UL — HIGH (ref 3.8–10.5)

## 2025-06-17 PROCEDURE — 99232 SBSQ HOSP IP/OBS MODERATE 35: CPT

## 2025-06-17 RX ORDER — ACETAMINOPHEN 500 MG/5ML
975 LIQUID (ML) ORAL EVERY 6 HOURS
Refills: 0 | Status: DISCONTINUED | OUTPATIENT
Start: 2025-06-17 | End: 2025-06-17

## 2025-06-17 RX ORDER — MAGNESIUM SULFATE 500 MG/ML
2 SYRINGE (ML) INJECTION ONCE
Refills: 0 | Status: COMPLETED | OUTPATIENT
Start: 2025-06-17 | End: 2025-06-17

## 2025-06-17 RX ORDER — WHITE PETROLATUM 1 G/G
1 OINTMENT TOPICAL DAILY
Refills: 0 | Status: DISCONTINUED | OUTPATIENT
Start: 2025-06-17 | End: 2025-06-20

## 2025-06-17 RX ORDER — PIPERACILLIN-TAZO-DEXTROSE,ISO 3.375G/5
3.38 IV SOLUTION, PIGGYBACK PREMIX FROZEN(ML) INTRAVENOUS EVERY 8 HOURS
Refills: 0 | Status: DISCONTINUED | OUTPATIENT
Start: 2025-06-17 | End: 2025-06-20

## 2025-06-17 RX ORDER — ACETAMINOPHEN 500 MG/5ML
1000 LIQUID (ML) ORAL EVERY 6 HOURS
Refills: 0 | Status: COMPLETED | OUTPATIENT
Start: 2025-06-17 | End: 2025-06-18

## 2025-06-17 RX ADMIN — Medication 400 MILLIGRAM(S): at 09:17

## 2025-06-17 RX ADMIN — Medication 25 GRAM(S): at 21:19

## 2025-06-17 RX ADMIN — Medication 400 MILLIGRAM(S): at 14:40

## 2025-06-17 RX ADMIN — Medication 25 GRAM(S): at 04:09

## 2025-06-17 RX ADMIN — Medication 100 MILLIEQUIVALENT(S): at 10:36

## 2025-06-17 RX ADMIN — Medication 1000 MILLIGRAM(S): at 15:00

## 2025-06-17 RX ADMIN — AMLODIPINE BESYLATE 5 MILLIGRAM(S): 10 TABLET ORAL at 06:20

## 2025-06-17 RX ADMIN — Medication 400 MILLIGRAM(S): at 04:09

## 2025-06-17 RX ADMIN — ATORVASTATIN CALCIUM 40 MILLIGRAM(S): 80 TABLET, FILM COATED ORAL at 21:19

## 2025-06-17 RX ADMIN — METOPROLOL SUCCINATE 25 MILLIGRAM(S): 50 TABLET, EXTENDED RELEASE ORAL at 17:30

## 2025-06-17 RX ADMIN — OXYCODONE HYDROCHLORIDE 5 MILLIGRAM(S): 30 TABLET ORAL at 10:11

## 2025-06-17 RX ADMIN — ENOXAPARIN SODIUM 40 MILLIGRAM(S): 100 INJECTION SUBCUTANEOUS at 23:00

## 2025-06-17 RX ADMIN — OXYCODONE HYDROCHLORIDE 5 MILLIGRAM(S): 30 TABLET ORAL at 16:51

## 2025-06-17 RX ADMIN — OXYCODONE HYDROCHLORIDE 5 MILLIGRAM(S): 30 TABLET ORAL at 11:11

## 2025-06-17 RX ADMIN — OXYCODONE HYDROCHLORIDE 5 MILLIGRAM(S): 30 TABLET ORAL at 17:45

## 2025-06-17 RX ADMIN — Medication 400 MILLIGRAM(S): at 21:19

## 2025-06-17 RX ADMIN — Medication 100 MILLIEQUIVALENT(S): at 07:58

## 2025-06-17 RX ADMIN — Medication 1000 MILLIGRAM(S): at 05:09

## 2025-06-17 RX ADMIN — SERTRALINE 25 MILLIGRAM(S): 100 TABLET, FILM COATED ORAL at 11:40

## 2025-06-17 RX ADMIN — Medication 1000 MILLIGRAM(S): at 22:19

## 2025-06-17 RX ADMIN — Medication 1000 MILLIGRAM(S): at 10:17

## 2025-06-17 RX ADMIN — Medication 25 GRAM(S): at 06:15

## 2025-06-17 RX ADMIN — SODIUM CHLORIDE 40 MILLILITER(S): 9 INJECTION, SOLUTION INTRAVENOUS at 23:00

## 2025-06-17 RX ADMIN — SODIUM CHLORIDE 40 MILLILITER(S): 9 INJECTION, SOLUTION INTRAVENOUS at 06:40

## 2025-06-17 RX ADMIN — METOPROLOL SUCCINATE 25 MILLIGRAM(S): 50 TABLET, EXTENDED RELEASE ORAL at 06:19

## 2025-06-17 RX ADMIN — Medication 25 GRAM(S): at 12:49

## 2025-06-17 RX ADMIN — Medication 100 MILLIEQUIVALENT(S): at 09:14

## 2025-06-17 NOTE — PHYSICAL THERAPY INITIAL EVALUATION ADULT - DID THE PATIENT HAVE SURGERY?
Exploratory laparotomy, Open low anterior resection of colon, Small bowel resection, and Drainage of abdominal abscess/yes

## 2025-06-17 NOTE — PROGRESS NOTE ADULT - SUBJECTIVE AND OBJECTIVE BOX
[facility] Attending Follow-up Note     Patient seen and examined at bedside. 6/17/2025    Overnight Events:     events noted    REVIEW OF SYSTEMS:  Constitutional:     [x ] negative [ ] fevers [ ] chills [ ] weight loss [ ] weight gain  HEENT:                  [x ] negative [ ] dry eyes [ ] eye irritation [ ] postnasal drip [ ] nasal congestion  CV:                         [ x] negative  [ ] chest pain [ ] orthopnea [ ] palpitations [ ] murmur  Resp:                     [ x] negative [ ] cough [ ] shortness of breath [ ] dyspnea [ ] wheezing [ ] sputum [ ]hemoptysis  GI:                          [ x] negative [ ] nausea [ ] vomiting [ ] diarrhea [ ] constipation [ ] abd pain [ ] dysphagia   :                        [ x] negative [ ] dysuria [ ] nocturia [ ] hematuria [ ] increased urinary frequency  Musculoskeletal: [ x] negative [ ] back pain [ ] myalgias [ ] arthralgias [ ] fracture  Skin:                       [ x] negative [ ] rash [ ] itch  Neurological:        [x ] negative [ ] headache [ ] dizziness [ ] syncope [ ] weakness [ ] numbness  Psychiatric:           [ x] negative [ ] anxiety [ ] depression  Endocrine:            [ x] negative [ ] diabetes [ ] thyroid problem  Heme/Lymph:      [ x] negative [ ] anemia [ ] bleeding problem  Allergic/Immune: [ x] negative [ ] itchy eyes [ ] nasal discharge [ ] hives [ ] angioedema    [ x] All other systems negative  [ ] Unable to assess ROS due to    Current Meds:      PAST MEDICAL & SURGICAL HISTORY:  Hypertension      CAD (coronary artery disease)          Vitals:  T(F): --  HR: --  BP: --  RR: --  SpO2: --  I&O's Summary    20 Jun 2025 07:01  -  21 Jun 2025 07:00  --------------------------------------------------------  IN: 490 mL / OUT: 250 mL / NET: 240 mL        Physical Exam:  Appearance: No acute distress  HENT: No JVD   Cardiovascular: RRR, S1/S2, no murmurs  Respiratory: CTABL  Gastrointestinal: soft, NT ND, +BS  Musculoskeletal: No clubbing, no edema   Neurologic: Non-focal  Skin: No rashes, ecchymoses, or cyanosis                          7.8    9.54  )-----------( 436      ( 20 Jun 2025 05:46 )             26.7     06-20    137  |  102  |  6[L]  ----------------------------<  117[H]  4.0   |  27  |  0.79    Ca    8.7      20 Jun 2025 05:46  Phos  2.6     06-20  Mg     2.10     06-20                    Cardiovascular Testings:

## 2025-06-17 NOTE — PHYSICAL THERAPY INITIAL EVALUATION ADULT - GENERAL OBSERVATIONS, REHAB EVAL
Patient received sitting in bedside chair with +IV, +drain, +monitor lines, and in NAD. Patient's son present at bedside throughout PT session. Patient's HR: 79 beats per minute

## 2025-06-17 NOTE — PHYSICAL THERAPY INITIAL EVALUATION ADULT - LEVEL OF INDEPENDENCE: GAIT, REHAB EVAL
Patient deferred due to reports of fatigue/unable to perform Patient deferred ambulation at this time despite maximal encouragement due to reports of fatigue/unable to perform

## 2025-06-17 NOTE — PHYSICAL THERAPY INITIAL EVALUATION ADULT - LEVEL OF INDEPENDENCE: SIT/STAND, REHAB EVAL
Patient deferred out of chair activity at this time due to reports of fatigue/unable to perform Patient deferred out of chair activity at this time despite maximal encouragement due to reports of fatigue/unable to perform

## 2025-06-17 NOTE — PHYSICAL THERAPY INITIAL EVALUATION ADULT - MANUAL MUSCLE TESTING RESULTS, REHAB EVAL
2/5 in bilateral upper extremities, at least 3/5 in bilateral lower extremities/grossly assessed due to

## 2025-06-17 NOTE — PROGRESS NOTE ADULT - ASSESSMENT
A/P: 89F PMH DM, CAD, HTN, HLD, and recently diagnosed Colon Ca. Patient was recently diagnosed with colon cancer at [facility] where she was admitted for a lower GI bleed and low Hg of 6.5, per son, patient had blood in the stool for several months before admission. At [facility] patient had serval blood transfusions and had a CT scan showing  mural thickening and adjacent stranding of proximal sigmoid colon, suspicious of mural neoplasm, following colonoscopy showed  melanosis in colon,  fungating infiltrative polypoid and ulcerated partially obstructing large mass was near to sigmoid colon (per chart review) . Incidental left common femoral vein filling defect noted on CTAP imaging on this admission (6/10/25)concerning for DVT s/p IVC filter 6/11. S/p IR drainage of abdominal collection 6/12. IR cultures returned pansensitive E. coli. Now s/p open LAR, small bowel resection, drainage of abdominal abscess 6/16/25.     Plan   - Slight H/H drop though HD stable and good urine output. Recheck CBC in PM.  - Advance to FLD.   - IVF LR @40  - Nutrition consult per patient/family request  - Strict I&O's  - Philippe to BSD  - Monitor ARMIN drain output  - ARMIN teaching  - Analgesia and antiemetics as needed. Tylenol/Oxycodone  - zosyn   - OOB ambulate with assistance  - PT consult  - DVT prophylaxis w/ SCD.      A Team Surgery  #[id number]

## 2025-06-17 NOTE — PROGRESS NOTE ADULT - ASSESSMENT
89F w/ PMH of HTN, HLD, T2DM, CAD here for new dx of colonic mass likely colon CA admitted for fever, large RP mass, and possible LLE DVT. Cardiology consulted for preop cardiac eval for IR drain of the the retroperitoneal abscess.     -TTE w/ severe MR, +WMAs. euvolemic, no cp. neg trop and CKMB   -US w/ L above the knee DVT, s/p IVC filter   -s/p IR drain and LAR with colorectal surgery   -cont abx per primary team   -outpt cardiology f/u     Flavio Harris MD [facility]  Attending Interventional Cardiologist, [facility]-NS/GENARO.   Avaliable on [employer] Team

## 2025-06-17 NOTE — PROGRESS NOTE ADULT - SUBJECTIVE AND OBJECTIVE BOX
A Team Surgical Oncology Progress Note    S: Pt seen and examined with team on morning rounds. Patient feels well. Pain is currently controlled. Tolerated some CLD without nausea/emesis. No Flatus or BM. Admits to some pain in left wrist and paresthesias in fingers from A line hematoma however improving from last night.      Vital Signs Last 24 Hrs  T(C): 36.9 (17 Jun 2025 05:30), Max: 36.9 (17 Jun 2025 05:30)  T(F): 98.4 (17 Jun 2025 05:30), Max: 98.4 (17 Jun 2025 05:30)  HR: 79 (17 Jun 2025 05:30) (55 - 100)  BP: 125/51 (17 Jun 2025 05:30) (110/61 - 154/62)  BP(mean): 84 (16 Jun 2025 22:00) (70 - 87)  RR: 16 (17 Jun 2025 05:30) (12 - 22)  SpO2: 93% (17 Jun 2025 05:30) (93% - 100%)    Parameters below as of 17 Jun 2025 05:30  Patient On (Oxygen Delivery Method): room air        I&O's Summary    16 Jun 2025 07:01  -  17 Jun 2025 07:00  --------------------------------------------------------  IN: 1335 mL / OUT: 920 mL / NET: 415 mL      I&O's Detail    16 Jun 2025 07:01  -  17 Jun 2025 07:00  --------------------------------------------------------  IN:    IV PiggyBack: 125 mL    Lactated Ringers: 500 mL    Lactated Ringers: 560 mL    Oral Fluid: 150 mL  Total IN: 1335 mL    OUT:    Bulb (mL): 70 mL    Indwelling Catheter - Urethral (mL): 850 mL  Total OUT: 920 mL    Total NET: 415 mL          General Appearance: Appears well, NAD  Chest: Breathing comfortably on RA.    CV: S1, S2 @ 79  Abdomen: Softly distended, appropriate incisional tenderness Preveena in place with good seal. No surrounding erythema/drainage. L ARMIN serosanguinous.   Extremities: Moves all extremities.  L Wrist: Pressure dressing removed. + Ecchymosis/swelling/hematoma - soft. Radial/Ulnar pulse 2+. Cap refill less than 2 seconds. Neuromuscular intact.     LABS:                        7.9    18.54 )-----------( 409      ( 17 Jun 2025 04:18 )             26.7     06-17    137  |  102  |  6[L]  ----------------------------<  145[H]  3.5   |  22  |  0.77    Ca    8.1[L]      17 Jun 2025 04:18  Phos  3.1     06-17  Mg     1.70     06-17      PT/INR - ( 16 Jun 2025 05:32 )   PT: 11.6 sec;   INR: 0.97 ratio         PTT - ( 16 Jun 2025 05:32 )  PTT:36.3 sec     A Team Colorectal Surgery Progress Note    S: Pt seen and examined with team on morning rounds. Patient feels well. Pain is currently controlled. Tolerated some CLD without nausea/emesis. No Flatus or BM. Admits to some pain in left wrist and paresthesias in fingers from A line hematoma however improving from last night.      Vital Signs Last 24 Hrs  T(C): 36.9 (17 Jun 2025 05:30), Max: 36.9 (17 Jun 2025 05:30)  T(F): 98.4 (17 Jun 2025 05:30), Max: 98.4 (17 Jun 2025 05:30)  HR: 79 (17 Jun 2025 05:30) (55 - 100)  BP: 125/51 (17 Jun 2025 05:30) (110/61 - 154/62)  BP(mean): 84 (16 Jun 2025 22:00) (70 - 87)  RR: 16 (17 Jun 2025 05:30) (12 - 22)  SpO2: 93% (17 Jun 2025 05:30) (93% - 100%)    Parameters below as of 17 Jun 2025 05:30  Patient On (Oxygen Delivery Method): room air        I&O's Summary    16 Jun 2025 07:01  -  17 Jun 2025 07:00  --------------------------------------------------------  IN: 1335 mL / OUT: 920 mL / NET: 415 mL      I&O's Detail    16 Jun 2025 07:01  -  17 Jun 2025 07:00  --------------------------------------------------------  IN:    IV PiggyBack: 125 mL    Lactated Ringers: 500 mL    Lactated Ringers: 560 mL    Oral Fluid: 150 mL  Total IN: 1335 mL    OUT:    Bulb (mL): 70 mL    Indwelling Catheter - Urethral (mL): 850 mL  Total OUT: 920 mL    Total NET: 415 mL          General Appearance: Appears well, NAD  Chest: Breathing comfortably on RA.    CV: S1, S2 @ 79  Abdomen: Softly distended, appropriate incisional tenderness Preveena in place with good seal. No surrounding erythema/drainage. L ARMIN serosanguinous.   Extremities: Moves all extremities.  L Wrist: Pressure dressing removed. + Ecchymosis/swelling/hematoma - soft. Radial/Ulnar pulse 2+. Cap refill less than 2 seconds. Neuromuscular intact.     LABS:                        7.9    18.54 )-----------( 409      ( 17 Jun 2025 04:18 )             26.7     06-17    137  |  102  |  6[L]  ----------------------------<  145[H]  3.5   |  22  |  0.77    Ca    8.1[L]      17 Jun 2025 04:18  Phos  3.1     06-17  Mg     1.70     06-17      PT/INR - ( 16 Jun 2025 05:32 )   PT: 11.6 sec;   INR: 0.97 ratio         PTT - ( 16 Jun 2025 05:32 )  PTT:36.3 sec

## 2025-06-17 NOTE — PHYSICAL THERAPY INITIAL EVALUATION ADULT - NSPTDISCHREC_GEN_A_CORE
Discharge recommendation to be determined after gait assessment To be determined upon further assessment of functional mobility. Please refer to PT notes for further assessment when feasible.

## 2025-06-17 NOTE — PHYSICAL THERAPY INITIAL EVALUATION ADULT - ADDITIONAL COMMENTS
Patient lives in a house with her son and has no stairs to enter. Patient uses a chair lift to get to the second floor where her bedroom and bathroom are located. Patient reports being previously independent with ambulation but owns a cane. Patient reports zero falls in the last 6 months.  Patient left sitting in bedside chair with +IV, +drain, +monitor lines, and in NAD. RN, Maryana, made aware of patient's position and participation in session. Patient lives in a house with her son and has no stairs to enter. Patient uses a chair lift to get to the second floor where her bedroom and bathroom are located. Patient reports being previously independent with ambulation and utilizing a cane for home activity and a rolling walker for out of the house ambulation. Patient reports zero falls in the last 6 months.  Patient left sitting in bedside chair with +IV, +drain, +monitor lines, and in NAD. RN, Maryana, made aware of patient's position and participation in session.

## 2025-06-17 NOTE — PHYSICAL THERAPY INITIAL EVALUATION ADULT - PERTINENT HX OF CURRENT PROBLEM, REHAB EVAL
Patient is a 89 year old female with chief complaint of GI bleed seen symptoms post exploratory laparotomy, open low anterior resection of colon, small bowel resection, and drainage of abdominal abscess.

## 2025-06-18 ENCOUNTER — TRANSCRIPTION ENCOUNTER (OUTPATIENT)
Age: 89
End: 2025-06-18

## 2025-06-18 LAB
ANION GAP SERPL CALC-SCNC: 11 MMOL/L — SIGNIFICANT CHANGE UP (ref 7–14)
BUN SERPL-MCNC: 7 MG/DL — SIGNIFICANT CHANGE UP (ref 7–23)
CALCIUM SERPL-MCNC: 8.3 MG/DL — LOW (ref 8.4–10.5)
CHLORIDE SERPL-SCNC: 101 MMOL/L — SIGNIFICANT CHANGE UP (ref 98–107)
CO2 SERPL-SCNC: 22 MMOL/L — SIGNIFICANT CHANGE UP (ref 22–31)
CREAT SERPL-MCNC: 0.84 MG/DL — SIGNIFICANT CHANGE UP (ref 0.5–1.3)
EGFR: 65 ML/MIN/1.73M2 — SIGNIFICANT CHANGE UP
EGFR: 65 ML/MIN/1.73M2 — SIGNIFICANT CHANGE UP
GLUCOSE SERPL-MCNC: 117 MG/DL — HIGH (ref 70–99)
HCT VFR BLD CALC: 28.9 % — LOW (ref 34.5–45)
HGB BLD-MCNC: 8.3 G/DL — LOW (ref 11.5–15.5)
MAGNESIUM SERPL-MCNC: 2.2 MG/DL — SIGNIFICANT CHANGE UP (ref 1.6–2.6)
MCHC RBC-ENTMCNC: 21.4 PG — LOW (ref 27–34)
MCHC RBC-ENTMCNC: 28.7 G/DL — LOW (ref 32–36)
MCV RBC AUTO: 74.7 FL — LOW (ref 80–100)
NRBC # BLD AUTO: 0 K/UL — SIGNIFICANT CHANGE UP (ref 0–0)
NRBC # FLD: 0 K/UL — SIGNIFICANT CHANGE UP (ref 0–0)
NRBC BLD AUTO-RTO: 0 /100 WBCS — SIGNIFICANT CHANGE UP (ref 0–0)
PHOSPHATE SERPL-MCNC: 2 MG/DL — LOW (ref 2.5–4.5)
PLATELET # BLD AUTO: 480 K/UL — HIGH (ref 150–400)
PMV BLD: 9.2 FL — SIGNIFICANT CHANGE UP (ref 7–13)
POTASSIUM SERPL-MCNC: 4.5 MMOL/L — SIGNIFICANT CHANGE UP (ref 3.5–5.3)
POTASSIUM SERPL-SCNC: 4.5 MMOL/L — SIGNIFICANT CHANGE UP (ref 3.5–5.3)
RBC # BLD: 3.87 M/UL — SIGNIFICANT CHANGE UP (ref 3.8–5.2)
RBC # FLD: 25.6 % — HIGH (ref 10.3–14.5)
SODIUM SERPL-SCNC: 134 MMOL/L — LOW (ref 135–145)
WBC # BLD: 15.75 K/UL — HIGH (ref 3.8–10.5)
WBC # FLD AUTO: 15.75 K/UL — HIGH (ref 3.8–10.5)

## 2025-06-18 PROCEDURE — 99232 SBSQ HOSP IP/OBS MODERATE 35: CPT

## 2025-06-18 RX ORDER — SODIUM PHOSPHATE,DIBASIC DIHYD
30 POWDER (GRAM) MISCELLANEOUS ONCE
Refills: 0 | Status: DISCONTINUED | OUTPATIENT
Start: 2025-06-18 | End: 2025-06-18

## 2025-06-18 RX ORDER — SOD PHOS DI, MONO/K PHOS MONO 250 MG
2 TABLET ORAL ONCE
Refills: 0 | Status: COMPLETED | OUTPATIENT
Start: 2025-06-18 | End: 2025-06-18

## 2025-06-18 RX ORDER — SODIUM PHOSPHATE,DIBASIC DIHYD
15 POWDER (GRAM) MISCELLANEOUS ONCE
Refills: 0 | Status: COMPLETED | OUTPATIENT
Start: 2025-06-18 | End: 2025-06-18

## 2025-06-18 RX ADMIN — OXYCODONE HYDROCHLORIDE 2.5 MILLIGRAM(S): 30 TABLET ORAL at 23:52

## 2025-06-18 RX ADMIN — Medication 25 GRAM(S): at 06:21

## 2025-06-18 RX ADMIN — Medication 1000 MILLIGRAM(S): at 04:07

## 2025-06-18 RX ADMIN — Medication 400 MILLIGRAM(S): at 10:26

## 2025-06-18 RX ADMIN — Medication 1000 MILLIGRAM(S): at 11:26

## 2025-06-18 RX ADMIN — Medication 1000 MILLIGRAM(S): at 16:00

## 2025-06-18 RX ADMIN — METOPROLOL SUCCINATE 25 MILLIGRAM(S): 50 TABLET, EXTENDED RELEASE ORAL at 17:47

## 2025-06-18 RX ADMIN — Medication 25 GRAM(S): at 22:52

## 2025-06-18 RX ADMIN — SERTRALINE 25 MILLIGRAM(S): 100 TABLET, FILM COATED ORAL at 11:52

## 2025-06-18 RX ADMIN — METOPROLOL SUCCINATE 25 MILLIGRAM(S): 50 TABLET, EXTENDED RELEASE ORAL at 06:22

## 2025-06-18 RX ADMIN — Medication 2 PACKET(S): at 10:26

## 2025-06-18 RX ADMIN — OXYCODONE HYDROCHLORIDE 2.5 MILLIGRAM(S): 30 TABLET ORAL at 22:52

## 2025-06-18 RX ADMIN — Medication 400 MILLIGRAM(S): at 03:07

## 2025-06-18 RX ADMIN — SODIUM CHLORIDE 40 MILLILITER(S): 9 INJECTION, SOLUTION INTRAVENOUS at 16:02

## 2025-06-18 RX ADMIN — ENOXAPARIN SODIUM 40 MILLIGRAM(S): 100 INJECTION SUBCUTANEOUS at 22:53

## 2025-06-18 RX ADMIN — Medication 63.75 MILLIMOLE(S): at 16:02

## 2025-06-18 RX ADMIN — AMLODIPINE BESYLATE 5 MILLIGRAM(S): 10 TABLET ORAL at 06:22

## 2025-06-18 RX ADMIN — Medication 25 GRAM(S): at 15:25

## 2025-06-18 RX ADMIN — WHITE PETROLATUM 1 APPLICATION(S): 1 OINTMENT TOPICAL at 11:52

## 2025-06-18 RX ADMIN — Medication 400 MILLIGRAM(S): at 15:28

## 2025-06-18 NOTE — PROVIDER CONTACT NOTE (CRITICAL VALUE NOTIFICATION) - ASSESSMENT
Patient axox4. Patient is Beninese speaking. Patient vital signs stable
Pt awake, alert and oriented x3. Pt afebrile.

## 2025-06-18 NOTE — PROGRESS NOTE ADULT - SUBJECTIVE AND OBJECTIVE BOX
TEAM [ A ] Surgery Daily Progress Note  =====================================================    SUBJECTIVE: Patient seen and examined at bedside on AM rounds on [employer],  Per daughter no issue overnight. Pt. was not able to work with PT yesterday but Patient reports feeling well, pain controlled on current regimen. Tolerating diet, denies nausea, vomiting but taking in little. Denies fever, chills.    ALLERGIES:  No Known Allergies      --------------------------------------------------------------------------------------    MEDICATIONS:    Neurologic Medications  acetaminophen   IVPB .. 1000 milliGRAM(s) IV Intermittent every 6 hours  melatonin 6 milliGRAM(s) Oral at bedtime PRN Insomnia  ondansetron   Disintegrating Tablet 4 milliGRAM(s) Oral every 6 hours PRN Nausea and/or Vomiting  oxyCODONE    IR 2.5 milliGRAM(s) Oral every 4 hours PRN Moderate Pain (4 - 6)  oxyCODONE    IR 5 milliGRAM(s) Oral every 6 hours PRN Severe Pain (7 - 10)  sertraline 25 milliGRAM(s) Oral daily    Respiratory Medications    Cardiovascular Medications  amLODIPine   Tablet 5 milliGRAM(s) Oral daily  metoprolol tartrate 25 milliGRAM(s) Oral two times a day    Gastrointestinal Medications  lactated ringers. 1000 milliLiter(s) IV Continuous <Continuous>    Genitourinary Medications    Hematologic/Oncologic Medications  enoxaparin Injectable 40 milliGRAM(s) SubCutaneous every 24 hours    Antimicrobial/Immunologic Medications  piperacillin/tazobactam IVPB.. 3.375 Gram(s) IV Intermittent every 8 hours    Endocrine/Metabolic Medications  atorvastatin 40 milliGRAM(s) Oral at bedtime    Topical/Other Medications  petrolatum white Ointment 1 Application(s) Topical daily    --------------------------------------------------------------------------------------    VITAL SIGNS:  T(C): 36.6 (06-18-25 @ 05:09), Max: 36.8 (06-17-25 @ 09:16)  HR: 59 (06-18-25 @ 05:09) (59 - 76)  BP: 123/49 (06-18-25 @ 05:09) (116/99 - 125/50)  RR: 18 (06-18-25 @ 05:09) (16 - 18)  SpO2: 100% (06-18-25 @ 05:09) (93% - 100%)  --------------------------------------------------------------------------------------    EXAM    General: NAD, resting in bed comfortably.  Cardiac: regular rate, warm and well perfused  Respiratory: Nonlabored respirations, normal cw expansion.  Abdomen: soft, nontender, nondistended. incision is c/d/i, ARMIN: SS   Extremities: no gross deformities    --------------------------------------------------------------------------------------    LABS                          8.3    15.75 )-----------( 480      ( 18 Jun 2025 04:22 )             28.9   06-18    134[L]  |  101  |  7   ----------------------------<  117[H]  4.5   |  22  |  0.84    Ca    8.3[L]      18 Jun 2025 04:22  Phos  2.0     06-18  Mg     2.20     06-18      --------------------------------------------------------------------------------------    INS AND OUTS:    06-17-25 @ 07:01  -  06-18-25 @ 07:00  --------------------------------------------------------  IN: 2620 mL / OUT: 1715 mL / NET: 905 mL      --------------------------------------------------------------------------------------

## 2025-06-18 NOTE — DISCHARGE NOTE PROVIDER - HOSPITAL COURSE
89F PMH DM, CAD, HTN, HLD, and recently diagnosed Colon Ca. Patient presenting with sporadic fevers at home for 1 week and weakness. Patient was recently diagnosed with colon cancer at [facility] where she was admitted for a lower GI bleed and low Hg of 6.5, per son, patient had had blood in the stool for several months before admission. At [facility] patient had serval blood transfusions and had a CT scan showing  mural thickening and adjacent stranding of proximal sigmoid colon, suspicious of mural neoplasm, following colonoscopy showed  melanosis in colon,  fungating infiltrative polypoid and ulcerated partially obstructing large mass was near to sigmoid colon (per chart review) Pathology not available on chart review; patient and family were instructed to f/u with colorectal surgery but have not seen anyone yet. Patient had never had a colonoscopy before this. TTE at OSH EF of 60%.     Patient came into ED on June 9, 2025 due to fever. In ED patient has Fever 100.4, HDS, Denies pain, TTP LLQ, WBC 14.5, lactate 2.3 improved to 0.7 after 1L of IVF. CT: Complex multiloculated rim-enhancing collection measuring up to 8.1 cm within the left lower retroperitoneum abuts a thickened segment of sigmoid colon, suspicious for perforated diverticulitis vs  colonic malignancy with perforation, additionally left common femoral vein is suspicious for DVT.  Patient subsequently Patient was admitted to surgical service for further management.   Patient was placed on IV antibiotic and also was seen by Cardiology, Vascular surgery and IR.  The significant hospital event listed as follow:    6/11/2025 s/p IVC placement  6/16/2025 Diagnostic Laparoscopy converted to Exploratory Laparotomy, Enbloc Resection of Rectosigmoid (LAR) and small bowel resection with Stapled small bowel anastomosis and Colorectal EEA with 28 EEA, LLQ and pelvic drain placement. L ureter identified.  Enblock Rectosigmoid and small bowel, EEA donuts  Patient tolerated operation well and there were no post-operative complications identified. Patient remained hemodynamically stable in the PACU and transferred to the surgical floor. Diet was restarted and advanced as tolerated. Pain control was transitioned from IV to PO pain meds. At this time, patient is currently ambulating, voiding, tolerating a regular diet. Patient has been deemed stable for discharge with instruction to follow up as an outpatient.    Post operatively, the patient's diet was slowly advanced as tolerated.  At this time, patient is tolerating a regular diet, ambulating and voiding.  Patient has been deemed stable for discharge at this time.    .

## 2025-06-18 NOTE — PROVIDER CONTACT NOTE (CRITICAL VALUE NOTIFICATION) - SITUATION
Body fluid culture 6/12 final report.  Rare ecoli streptoccus intermedius rare anerobic gram negative savanah.  Most closely resembling prevotell oris.
MD Em informed that body fluid specimen collected on 6/12 is rare ecoli

## 2025-06-18 NOTE — PROGRESS NOTE ADULT - ASSESSMENT
89F w/ PMH of HTN, HLD, T2DM, CAD here for new dx of colonic mass likely colon CA admitted for fever, large RP mass, and possible LLE DVT. Cardiology consulted for preop cardiac eval for IR drain of the the retroperitoneal abscess.     -TTE w/ severe MR, +WMAs. euvolemic, no cp. neg trop and CKMB   -US w/ L above the knee DVT, s/p IVC filter   -s/p IR drain and LAR with colorectal surgery   -cont abx per primary team   -outpt cardiology f/u     Flavio Harris MD [facility]  Attending Interventional Cardiologist, [facility]-NS/GENARO.   Avaliable on [employer] Team

## 2025-06-18 NOTE — PROGRESS NOTE ADULT - ATTENDING COMMENTS
prep tomorrow for OR monday  r/b/a d/w her and her daughter including but not limited to pain, bleeding, infection, and anastomotic leak
feels tired, leidy small amount of clears, + flatus  aaox3  abd soft, min tender at incision, prevena in place    PT today  clears for now  OOB
IVC filter placed yesterday  for drainage of pericolonic collection today  plan for OR next week
IVC filter and drain per IR  cont IV abx  surgical planning in the coming weeks

## 2025-06-18 NOTE — DISCHARGE NOTE PROVIDER - NSDCFUADDINST_GEN_ALL_CORE_FT
WOUND CARE:  Please keep incisions clean and dry. Please do not Scrub or rub incisions. Do not use lotion or powder on incisions.   BATHING: You may shower and/or sponge bathe. You may use warm soapy water in the shower and rinse, pat dry.  ACTIVITY: No heavy lifting or straining. Otherwise, you may return to your usual level of physical activity. If you are taking narcotic pain medication DO NOT drive a car, operate machinery or make important decisions.  DIET: Return to your usual diet.  NOTIFY YOUR SURGEON IF YOU HAVE: any bleeding that does not stop, any pus draining from your wound(s), any fever (over 100.4 F) persistent nausea/vomiting, or if your pain is not controlled on your discharge pain medications, unable to urinate.  Please follow up with your primary care physician in one week regarding your hospitalization, bring copies of your discharge paperwork.  Please follow up with your surgeon, Dr. HENDRIX   WOUND CARE:  Please keep incisions clean and dry. Please do not Scrub or rub incisions. Do not use lotion or powder on incisions.   BATHING: You may shower and/or sponge bathe. You may use warm soapy water in the shower and rinse, pat dry.  ACTIVITY: No heavy lifting or straining. Otherwise, you may return to your usual level of physical activity. If you are taking narcotic pain medication DO NOT drive a car, operate machinery or make important decisions.  DIET: Return to your usual diet.  NOTIFY YOUR SURGEON IF YOU HAVE: any bleeding that does not stop, any pus draining from your wound(s), any fever (over 100.4 F) persistent nausea/vomiting, or if your pain is not controlled on your discharge pain medications, unable to urinate.  Please follow up with your primary care physician in one week regarding your hospitalization, bring copies of your discharge paperwork.  Please follow up with your surgeon, Dr. HENDRIX    ADDITIONALLY, YOU HAVE AN IMAGING REPORT WITH INCIDENTAL FINDING AS NOTED. PLEASE FOLLOW UP WITH YOUR PRIMARY CARE DOCTOR FOR FURTHER INVESTIGATION:      3. Indeterminate 1.3 cm left lower pole renal lesion. Consider nonemergent follow-up with renal ultrasound or MRI.  4. 3.4 cm left adnexal cyst. Consider nonemergent follow-up with pelvic ultrasound.     WOUND CARE:  Please keep incisions clean and dry. Please do not Scrub or rub incisions. Do not use lotion or powder on incisions.   BATHING: You may shower and/or sponge bathe. You may use warm soapy water in the shower and rinse, pat dry.  ACTIVITY: No heavy lifting or straining. Otherwise, you may return to your usual level of physical activity. If you are taking narcotic pain medication DO NOT drive a car, operate machinery or make important decisions.  DIET: Return to your usual diet.  NOTIFY YOUR SURGEON IF YOU HAVE: any bleeding that does not stop, any pus draining from your wound(s), any fever (over 100.4 F) persistent nausea/vomiting, or if your pain is not controlled on your discharge pain medications, unable to urinate.  Please follow up with your primary care physician in one week regarding your hospitalization, bring copies of your discharge paperwork.  Please follow up with your surgeon, Dr. HENDRIX    ADDITIONALLY, YOU HAVE AN IMAGING REPORT WITH INCIDENTAL FINDING AS NOTED. PLEASE FOLLOW UP WITH YOUR PRIMARY CARE DOCTOR FOR FURTHER INVESTIGATION:    LIVER: Right hepatic simple cysts and calcifications. Cysts measure up to 1.4 cm and right hepatic lobe calcifications measuring about 6 mm in the inferior right hepatic lobe and 4 mm in the superior right hepatic lobe.  BILE DUCTS: Normal caliber.  KIDNEYS/URETERS: No hydronephrosis. Symmetric nephrograms. Bilateral renal cysts measuring up to 4 cm on the left. Few too small to characterize left cortical hypodensities. Indeterminate 1.3 cm left lower pole lesion (50 HU).         WOUND CARE:  Please keep incisions clean and dry. Please do not Scrub or rub incisions. Do not use lotion or powder on incisions.   BATHING: You may shower and/or sponge bathe. You may use warm soapy water in the shower and rinse, pat dry.  ACTIVITY: No heavy lifting or straining. Otherwise, you may return to your usual level of physical activity. If you are taking narcotic pain medication DO NOT drive a car, operate machinery or make important decisions.  DIET: Return to your usual diet.  NOTIFY YOUR SURGEON IF YOU HAVE: any bleeding that does not stop, any pus draining from your wound(s), any fever (over 100.4 F) persistent nausea/vomiting, or if your pain is not controlled on your discharge pain medications, unable to urinate.  Please follow up with your primary care physician in one week regarding your hospitalization, bring copies of your discharge paperwork.  Please follow up with your surgeon, Dr. SIMEON Bautista drain (ARMIN Drain)   You are being discharged with a ARMIN drain. It is important that you measure and record the fluid that is in it (output) on the output record sheet daily. Please bring the output record sheet to your follow-up appointment. Keep the drain secured to your clothing with a safety pin at all times to avoid accidental displacement. Monitor the insertion site for redness, pain, swelling, or purulent drainage.  You may shower with the drain. Wash around the drain with soap and water, pat dry, and reapply dressing. If you noticed a sudden change in the color or amount of fluid in the drain, please contact your surgeon’s office.  Your drain will be removed at an outpatient follow up appointment.      ADDITIONALLY, YOU HAVE AN IMAGING REPORT WITH INCIDENTAL FINDING AS NOTED. PLEASE FOLLOW UP WITH YOUR PRIMARY CARE DOCTOR FOR FURTHER INVESTIGATION:    LIVER: Right hepatic simple cysts and calcifications. Cysts measure up to 1.4 cm and right hepatic lobe calcifications measuring about 6 mm in the inferior right hepatic lobe and 4 mm in the superior right hepatic lobe.  BILE DUCTS: Normal caliber.  KIDNEYS/URETERS: No hydronephrosis. Symmetric nephrograms. Bilateral renal cysts measuring up to 4 cm on the left. Few too small to characterize left cortical hypodensities. Indeterminate 1.3 cm left lower pole lesion (50 HU).

## 2025-06-18 NOTE — DISCHARGE NOTE PROVIDER - CARE PROVIDER_API CALL
Daryl Riley  Colon & Rectal Surgery  [address] [place] [address], [address]  [place] [address]  Phone: [phone]  Fax: [phone]  Follow Up Time: 1 week

## 2025-06-18 NOTE — CHART NOTE - NSCHARTNOTEFT_GEN_A_CORE
NUTRITION FOLLOW UP NOTE    Pt seen for Consult- Nutrition Services Assessment/Education. Severe malnutrition follow-up.     SOURCE: [] Patient [X] Medical record [ ] RN/PCA [X] Family/Caregiver []Patient unavailable []Patient inappropriate (disoriented, nonverbal, intubated/sedated) [] Other:    Medical Course: Per chart, Pt is 89F PMH DM, CAD, HTN, HLD, and recently diagnosed Colon Ca. Patient was recently diagnosed with colon cancer at [facility] where she was admitted for a lower GI bleed and low Hg of 6.5, per son, patient had blood in the stool for several months before admission. At [facility] patient had serval blood transfusions and had a CT scan showing  mural thickening and adjacent stranding of proximal sigmoid colon, suspicious of mural neoplasm, following colonoscopy showed  melanosis in colon,  fungating infiltrative polypoid and ulcerated partially obstructing large mass was near to sigmoid colon (per chart review) . Incidental left common femoral vein filling defect noted on CTAP imaging on this admission (6/10/25)concerning for DVT s/p IVC filter 6/11. S/p IR drainage of abdominal collection 6/12. IR cultures returned pansensitive E. coli. Now s/p open LAR, small bowel resection, drainage of abdominal abscess 6/16/25.     Diet Prescription: Diet, Low Fiber:   Lactose Restricted (Milk Sugar Intoler.)  Milk Protein Restr(Milk Protein Allergy)  No Carbonated Beverages  Supplement Feeding Modality:  Oral  Glucerna Shake Cans or Servings Per Day:  1       Frequency:  Three Times a day (06-17-25 @ 14:02)      Nutrition Course: Pt seen at chairside with family present. Patient is [place] speaking. Offered language line  prefers family to translate for her. Reported patient with poor PO intake. Per RN flowsheet, Pt with poor PO intake 26-50% noted. Patient denies difficulty chewing or swallowing at present. Pt denies any nausea, vomiting, diarrhea, or constipation at this time. Per chart, patient  passing flatus, last BM 6/16. Not noted to be on a bowel regimen. Recommend to continue to monitor and document BMs in RN flowsheet. Labs noted for hyponatremia, hypophosphatemia. Pt ordered for sodium phosphate and potassium phosphate for micronutrient support. Pt's family educated on low fiber diet and understanding. RD remains available upon request and will follow up per protocol.         Pertinent Medications: MEDICATIONS  (STANDING):  acetaminophen   IVPB .. 1000 milliGRAM(s) IV Intermittent every 6 hours  amLODIPine   Tablet 5 milliGRAM(s) Oral daily  atorvastatin 40 milliGRAM(s) Oral at bedtime  enoxaparin Injectable 40 milliGRAM(s) SubCutaneous every 24 hours  lactated ringers. 1000 milliLiter(s) (40 mL/Hr) IV Continuous <Continuous>  metoprolol tartrate 25 milliGRAM(s) Oral two times a day  petrolatum white Ointment 1 Application(s) Topical daily  piperacillin/tazobactam IVPB.. 3.375 Gram(s) IV Intermittent every 8 hours  sertraline 25 milliGRAM(s) Oral daily  sodium phosphate 15 milliMole(s)/250 mL IVPB 15 milliMole(s) IV Intermittent once    MEDICATIONS  (PRN):  melatonin 6 milliGRAM(s) Oral at bedtime PRN Insomnia  ondansetron   Disintegrating Tablet 4 milliGRAM(s) Oral every 6 hours PRN Nausea and/or Vomiting  oxyCODONE    IR 2.5 milliGRAM(s) Oral every 4 hours PRN Moderate Pain (4 - 6)  oxyCODONE    IR 5 milliGRAM(s) Oral every 6 hours PRN Severe Pain (7 - 10)    Pertinent Labs: 06-18 Na134 mmol/L[L] Glu 117 mg/dL[H] K+ 4.5 mmol/L Cr  0.84 mg/dL BUN 7 mg/dL 06-18 Phos 2.0 mg/dL[L]    A1C with Estimated Average Glucose Result: 5.9 % (06-13-25 @ 03:51)  A1C with Estimated Average Glucose Result: 5.5 % (06-12-25 @ 04:35)      CAPILLARY BLOOD GLUCOSE  POCT Blood Glucose.: 163 mg/dL (18 Jun 2025 11:49)        Weight: 63.5kg (dosing wt 6/18), 66.4kg (6/11)  Weight Assessment: Wt loss x7 days (4.3%) likely r/t to poor PO intake.   Height: 62in / 157cm  IBW: 110lbs / 49.8kg +/-10%  BMI: kg/m^2    Physical Assessment, per flowsheets:  Edema: Bilateral leg edema 1+ noted.   Pressure Injury: No PI/DTI noted.       Estimated Needs:   [X] No change since previous assessment, based on dosing weight 49.8 kg  1494 - 1743 kcal daily @ 30 - 35 kcal/kg, 70 - 80 gm protein daily @ 1.4 - 1.6 gm/kg       Previous Nutrition Diagnosis:   [X] Severe Malnutrition   Nutrition Diagnosis is [X] ongoing  [ ] resolved [ ] not applicable   New Nutrition Diagnosis: [X] not applicable     Education:  [X] Provided pt with verbal / written education on Low fiber diet  [ ] Provided on previous assessment by RD  [ ] Not applicable secondary to   [ ] Pt refused     Interventions:   1) - Consider adding Kosher to low fiber lactose restricted diet. Defer food and fluid consistency to SLP/Team.   2) - Continue Glucerna 3x daily (provides 220 kcal, 10 gm protein per 8oz serving)   3) - Please consistently document % PO intake in nursing flowsheets to assess adequacy of nutritional intake/monitor need for further nutritional intervention(s).   4) - RD visited with patient for requested consultation.       Monitor & Evaluate:  PO intake, tolerance to diet/supplement, nutrition related lab values, weight trends, BMs/GI distress, hydration status, skin integrity.    Tre Mae RD, CDN. Available on MS teams,  Pager #[phone]

## 2025-06-18 NOTE — PROGRESS NOTE ADULT - NUTRITIONAL ASSESSMENT
This patient has been assessed with a concern for Malnutrition and has been determined to have a diagnosis/diagnoses of Severe protein-calorie malnutrition.    This patient is being managed with:   Diet Low Fiber-  Lactose Restricted (Milk Sugar Intoler.)  Milk Protein Restr(Milk Protein Allergy)  No Carbonated Beverages  Supplement Feeding Modality:  Oral  Glucerna Shake Cans or Servings Per Day:  1       Frequency:  Three Times a day  Entered: Jun 17 2025  2:02PM  
This patient has been assessed with a concern for Malnutrition and has been determined to have a diagnosis/diagnoses of Severe protein-calorie malnutrition.    This patient is being managed with:   Diet Full Liquid-  Fiber/Residue Restricted (LOWFIBER)  Supplement Feeding Modality:  Oral  Ensure Enlive Cans or Servings Per Day:  2       Frequency:  Two Times a day  Entered: Jun 17 2025  6:46AM

## 2025-06-18 NOTE — PROGRESS NOTE ADULT - ASSESSMENT
A/P: 89F PMH DM, CAD, HTN, HLD, and recently diagnosed Colon Ca. Patient was recently diagnosed with colon cancer at [facility] where she was admitted for a lower GI bleed and low Hg of 6.5, per son, patient had blood in the stool for several months before admission. At [facility] patient had serval blood transfusions and had a CT scan showing  mural thickening and adjacent stranding of proximal sigmoid colon, suspicious of mural neoplasm, following colonoscopy showed  melanosis in colon,  fungating infiltrative polypoid and ulcerated partially obstructing large mass was near to sigmoid colon (per chart review) . Incidental left common femoral vein filling defect noted on CTAP imaging on this admission (6/10/25)concerning for DVT s/p IVC filter 6/11. S/p IR drainage of abdominal collection 6/12. IR cultures returned pansensitive E. coli. Now s/p open LAR, small bowel resection, drainage of abdominal abscess 6/16/25.     Plan   - Pt. on regular diet, encourage PO intake.   - IVF LR @40 will d/c fluid once patient taking in more fluid  - Nutrition consult per patient/family request  - Strict I&O's  - Philippe to BSD  - Monitor ARMIN drain output  - ARMIN teaching  - Analgesia and antiemetics as needed. Tylenol/Oxycodone  -C/W zosyn + wound culture  - OOB ambulate with assistance  - PT consult again today, encourage ambulation and working with PT  - DVT prophylaxis w/ SCD.    -Dispo Planning      A Team Surgery  #[id number] A/P: 89F PMH DM, CAD, HTN, HLD, and recently diagnosed Colon Ca. Patient was recently diagnosed with colon cancer at [facility] where she was admitted for a lower GI bleed and low Hg of 6.5, per son, patient had blood in the stool for several months before admission. At [facility] patient had serval blood transfusions and had a CT scan showing  mural thickening and adjacent stranding of proximal sigmoid colon, suspicious of mural neoplasm, following colonoscopy showed  melanosis in colon,  fungating infiltrative polypoid and ulcerated partially obstructing large mass was near to sigmoid colon (per chart review) . Incidental left common femoral vein filling defect noted on CTAP imaging on this admission (6/10/25)concerning for DVT s/p IVC filter 6/11. S/p IR drainage of abdominal collection 6/12. IR cultures returned pansensitive E. coli. Now s/p open LAR, small bowel resection, drainage of abdominal abscess 6/16/25.     Plan   - Pt. on regular diet, encourage PO intake.   - IVF LR @40 will d/c fluid once patient taking in more fluid  - Nutrition consult per patient/family request  - Strict I&O's  - seyd out today  - Monitor ARMIN drain output  - ARMIN teaching  - Analgesia and antiemetics as needed. Tylenol/Oxycodone  -C/W zosyn + wound culture  - OOB ambulate with assistance  - PT consult again today, encourage ambulation and working with PT  - DVT prophylaxis w/ SCD.    -Dispo Planning      A Team Surgery  #[id number]

## 2025-06-18 NOTE — PROGRESS NOTE ADULT - SUBJECTIVE AND OBJECTIVE BOX
[facility] Attending Follow-up Note     Patient seen and examined at bedside. 6/18/2025    Overnight Events:     events noted     REVIEW OF SYSTEMS:  Constitutional:     [x ] negative [ ] fevers [ ] chills [ ] weight loss [ ] weight gain  HEENT:                  [x ] negative [ ] dry eyes [ ] eye irritation [ ] postnasal drip [ ] nasal congestion  CV:                         [ x] negative  [ ] chest pain [ ] orthopnea [ ] palpitations [ ] murmur  Resp:                     [ x] negative [ ] cough [ ] shortness of breath [ ] dyspnea [ ] wheezing [ ] sputum [ ]hemoptysis  GI:                          [ x] negative [ ] nausea [ ] vomiting [ ] diarrhea [ ] constipation [ ] abd pain [ ] dysphagia   :                        [ x] negative [ ] dysuria [ ] nocturia [ ] hematuria [ ] increased urinary frequency  Musculoskeletal: [ x] negative [ ] back pain [ ] myalgias [ ] arthralgias [ ] fracture  Skin:                       [ x] negative [ ] rash [ ] itch  Neurological:        [x ] negative [ ] headache [ ] dizziness [ ] syncope [ ] weakness [ ] numbness  Psychiatric:           [ x] negative [ ] anxiety [ ] depression  Endocrine:            [ x] negative [ ] diabetes [ ] thyroid problem  Heme/Lymph:      [ x] negative [ ] anemia [ ] bleeding problem  Allergic/Immune: [ x] negative [ ] itchy eyes [ ] nasal discharge [ ] hives [ ] angioedema    [ x] All other systems negative  [ ] Unable to assess ROS due to    Current Meds:      PAST MEDICAL & SURGICAL HISTORY:  Hypertension      CAD (coronary artery disease)          Vitals:  T(F): --  HR: --  BP: --  RR: --  SpO2: --  I&O's Summary    20 Jun 2025 07:01  -  21 Jun 2025 07:00  --------------------------------------------------------  IN: 490 mL / OUT: 250 mL / NET: 240 mL        Physical Exam:  Appearance: No acute distress  HENT: No JVD   Cardiovascular: RRR, S1/S2, no murmurs  Respiratory: CTABL  Gastrointestinal: soft, NT ND, +BS  Musculoskeletal: No clubbing, no edema   Neurologic: Non-focal  Skin: No rashes, ecchymoses, or cyanosis                          7.8    9.54  )-----------( 436      ( 20 Jun 2025 05:46 )             26.7     06-20    137  |  102  |  6[L]  ----------------------------<  117[H]  4.0   |  27  |  0.79    Ca    8.7      20 Jun 2025 05:46  Phos  2.6     06-20  Mg     2.10     06-20                    Cardiovascular Testings:

## 2025-06-18 NOTE — DISCHARGE NOTE PROVIDER - NSDCMRMEDTOKEN_GEN_ALL_CORE_FT
AMLODIPINE BESYLATE 5 MG TAB:   ATORVASTATIN 40 MG TABLET:   GABAPENTIN 400 MG CAPSULE:   LOSARTAN POTASSIUM 25 MG TAB:   METOPROLOL SUCC ER 50 MG TAB:   NITROGLYCERIN 0.4 MG/HR PATCH:   SERTRALINE HCL 25 MG TABLET:    acetaminophen 500 mg oral tablet: 2 tab(s) orally every 6 hours  amLODIPine 5 mg oral tablet: 1 tab(s) orally once a day  atorvastatin 40 mg oral tablet: 1 tab(s) orally once a day (at bedtime)  GABAPENTIN 400 MG CAPSULE:   losartan 25 mg oral tablet: 1 tab(s) orally once a day  metoprolol tartrate 25 mg oral tablet: 1 tab(s) orally 2 times a day  nitroglycerin 0.4 mg/hr transdermal film, extended release: 1 application transdermal once a day  sertraline 25 mg oral tablet: 1 tab(s) orally once a day   acetaminophen 500 mg oral tablet: 2 tab(s) orally every 6 hours  acetaminophen 500 mg oral tablet: 1 tab(s) orally 4 times a day  amLODIPine 5 mg oral tablet: 1 tab(s) orally once a day  atorvastatin 40 mg oral tablet: 1 tab(s) orally once a day (at bedtime)  Colace 100 mg oral capsule: 1 cap(s) orally 2 times a day only take when take oxycodone for constipation  GABAPENTIN 400 MG CAPSULE:   losartan 25 mg oral tablet: 1 tab(s) orally once a day  metoprolol tartrate 25 mg oral tablet: 1 tab(s) orally 2 times a day  nitroglycerin 0.4 mg/hr transdermal film, extended release: 1 application transdermal once a day  oxyCODONE 5 mg oral tablet: 1 tab(s) orally every 6 hours As needed Severe Pain (7 - 10)  sertraline 25 mg oral tablet: 1 tab(s) orally once a day

## 2025-06-18 NOTE — DISCHARGE NOTE PROVIDER - DETAILS OF MALNUTRITION DIAGNOSIS/DIAGNOSES
This patient has been assessed with a concern for Malnutrition and was treated during this hospitalization for the following Nutrition diagnosis/diagnoses:     -  06/16/2025: Severe protein-calorie malnutrition

## 2025-06-18 NOTE — DISCHARGE NOTE PROVIDER - NSDCCPTREATMENT_GEN_ALL_CORE_FT
PRINCIPAL PROCEDURE  Procedure: Exploratory laparotomy  Findings and Treatment:       SECONDARY PROCEDURE  Procedure: Open low anterior resection of colon  Findings and Treatment:

## 2025-06-19 LAB
ANION GAP SERPL CALC-SCNC: 9 MMOL/L — SIGNIFICANT CHANGE UP (ref 7–14)
BUN SERPL-MCNC: 7 MG/DL — SIGNIFICANT CHANGE UP (ref 7–23)
CALCIUM SERPL-MCNC: 8.7 MG/DL — SIGNIFICANT CHANGE UP (ref 8.4–10.5)
CHLORIDE SERPL-SCNC: 105 MMOL/L — SIGNIFICANT CHANGE UP (ref 98–107)
CO2 SERPL-SCNC: 25 MMOL/L — SIGNIFICANT CHANGE UP (ref 22–31)
CREAT SERPL-MCNC: 0.75 MG/DL — SIGNIFICANT CHANGE UP (ref 0.5–1.3)
EGFR: 75 ML/MIN/1.73M2 — SIGNIFICANT CHANGE UP
EGFR: 75 ML/MIN/1.73M2 — SIGNIFICANT CHANGE UP
GLUCOSE SERPL-MCNC: 105 MG/DL — HIGH (ref 70–99)
HCT VFR BLD CALC: 27.5 % — LOW (ref 34.5–45)
HGB BLD-MCNC: 8.1 G/DL — LOW (ref 11.5–15.5)
MAGNESIUM SERPL-MCNC: 1.9 MG/DL — SIGNIFICANT CHANGE UP (ref 1.6–2.6)
MCHC RBC-ENTMCNC: 22.1 PG — LOW (ref 27–34)
MCHC RBC-ENTMCNC: 29.5 G/DL — LOW (ref 32–36)
MCV RBC AUTO: 75.1 FL — LOW (ref 80–100)
NRBC # BLD AUTO: 0 K/UL — SIGNIFICANT CHANGE UP (ref 0–0)
NRBC # FLD: 0 K/UL — SIGNIFICANT CHANGE UP (ref 0–0)
NRBC BLD AUTO-RTO: 0 /100 WBCS — SIGNIFICANT CHANGE UP (ref 0–0)
PHOSPHATE SERPL-MCNC: 2.5 MG/DL — SIGNIFICANT CHANGE UP (ref 2.5–4.5)
PLATELET # BLD AUTO: 520 K/UL — HIGH (ref 150–400)
PMV BLD: 9.7 FL — SIGNIFICANT CHANGE UP (ref 7–13)
POTASSIUM SERPL-MCNC: 3.9 MMOL/L — SIGNIFICANT CHANGE UP (ref 3.5–5.3)
POTASSIUM SERPL-SCNC: 3.9 MMOL/L — SIGNIFICANT CHANGE UP (ref 3.5–5.3)
RBC # BLD: 3.66 M/UL — LOW (ref 3.8–5.2)
RBC # FLD: 26.2 % — HIGH (ref 10.3–14.5)
SODIUM SERPL-SCNC: 139 MMOL/L — SIGNIFICANT CHANGE UP (ref 135–145)
WBC # BLD: 10.67 K/UL — HIGH (ref 3.8–10.5)
WBC # FLD AUTO: 10.67 K/UL — HIGH (ref 3.8–10.5)

## 2025-06-19 PROCEDURE — 99232 SBSQ HOSP IP/OBS MODERATE 35: CPT

## 2025-06-19 RX ORDER — MAGNESIUM SULFATE 500 MG/ML
1 SYRINGE (ML) INJECTION ONCE
Refills: 0 | Status: COMPLETED | OUTPATIENT
Start: 2025-06-19 | End: 2025-06-19

## 2025-06-19 RX ORDER — SOD PHOS DI, MONO/K PHOS MONO 250 MG
1 TABLET ORAL ONCE
Refills: 0 | Status: COMPLETED | OUTPATIENT
Start: 2025-06-19 | End: 2025-06-19

## 2025-06-19 RX ADMIN — SERTRALINE 25 MILLIGRAM(S): 100 TABLET, FILM COATED ORAL at 13:25

## 2025-06-19 RX ADMIN — Medication 1 PACKET(S): at 09:40

## 2025-06-19 RX ADMIN — Medication 25 GRAM(S): at 13:25

## 2025-06-19 RX ADMIN — OXYCODONE HYDROCHLORIDE 5 MILLIGRAM(S): 30 TABLET ORAL at 13:27

## 2025-06-19 RX ADMIN — Medication 100 GRAM(S): at 09:40

## 2025-06-19 RX ADMIN — OXYCODONE HYDROCHLORIDE 5 MILLIGRAM(S): 30 TABLET ORAL at 23:21

## 2025-06-19 RX ADMIN — METOPROLOL SUCCINATE 25 MILLIGRAM(S): 50 TABLET, EXTENDED RELEASE ORAL at 18:44

## 2025-06-19 RX ADMIN — Medication 25 GRAM(S): at 21:55

## 2025-06-19 RX ADMIN — Medication 25 GRAM(S): at 05:17

## 2025-06-19 RX ADMIN — AMLODIPINE BESYLATE 5 MILLIGRAM(S): 10 TABLET ORAL at 05:17

## 2025-06-19 RX ADMIN — OXYCODONE HYDROCHLORIDE 5 MILLIGRAM(S): 30 TABLET ORAL at 12:27

## 2025-06-19 RX ADMIN — METOPROLOL SUCCINATE 25 MILLIGRAM(S): 50 TABLET, EXTENDED RELEASE ORAL at 05:18

## 2025-06-19 RX ADMIN — WHITE PETROLATUM 1 APPLICATION(S): 1 OINTMENT TOPICAL at 12:28

## 2025-06-19 RX ADMIN — ENOXAPARIN SODIUM 40 MILLIGRAM(S): 100 INJECTION SUBCUTANEOUS at 23:21

## 2025-06-19 NOTE — PROGRESS NOTE ADULT - SUBJECTIVE AND OBJECTIVE BOX
SURGERY DAILY PROGRESS NOTE    Overnight Events: No acute events overnight    SUBJECTIVE: Patient seen and evaluated on AM rounds. Pt is resting comfortably in bed continues to have abdominal pain with walking. Passing flatus and having BMs.     -----------------------------------------------------------------------------------------------------------------------------------------------------------------------------------------------------------  OBJECTIVE:  Vital Signs Last 24 Hrs  T(C): 36.7 (19 Jun 2025 05:09), Max: 37.1 (18 Jun 2025 17:45)  T(F): 98 (19 Jun 2025 05:09), Max: 98.8 (18 Jun 2025 17:45)  HR: 79 (19 Jun 2025 05:09) (64 - 79)  BP: 141/61 (19 Jun 2025 05:09) (126/58 - 148/71)  BP(mean): --  RR: 18 (19 Jun 2025 05:09) (17 - 18)  SpO2: 100% (19 Jun 2025 05:09) (96% - 100%)    Parameters below as of 19 Jun 2025 05:09  Patient On (Oxygen Delivery Method): room air      I&O's Detail    18 Jun 2025 07:01  -  19 Jun 2025 07:00  --------------------------------------------------------  IN:    IV PiggyBack: 200 mL    Lactated Ringers: 440 mL    Oral Fluid: 670 mL  Total IN: 1310 mL    OUT:    Bulb (mL): 165 mL    VAC (Vacuum Assisted Closure) System (mL): 0 mL  Total OUT: 165 mL    Total NET: 1145 mL        Daily     Daily   MEDICATIONS  (STANDING):  amLODIPine   Tablet 5 milliGRAM(s) Oral daily  atorvastatin 40 milliGRAM(s) Oral at bedtime  enoxaparin Injectable 40 milliGRAM(s) SubCutaneous every 24 hours  magnesium sulfate  IVPB 1 Gram(s) IV Intermittent once  metoprolol tartrate 25 milliGRAM(s) Oral two times a day  petrolatum white Ointment 1 Application(s) Topical daily  piperacillin/tazobactam IVPB.. 3.375 Gram(s) IV Intermittent every 8 hours  potassium phosphate / sodium phosphate Powder (PHOS-NaK) 1 Packet(s) Oral once  sertraline 25 milliGRAM(s) Oral daily    MEDICATIONS  (PRN):  melatonin 6 milliGRAM(s) Oral at bedtime PRN Insomnia  ondansetron   Disintegrating Tablet 4 milliGRAM(s) Oral every 6 hours PRN Nausea and/or Vomiting  oxyCODONE    IR 2.5 milliGRAM(s) Oral every 4 hours PRN Moderate Pain (4 - 6)  oxyCODONE    IR 5 milliGRAM(s) Oral every 6 hours PRN Severe Pain (7 - 10)      PHYSICAL EXAM:  General: NAD, resting comfortably in bed  HEENT: Normocephalic atraumatic  Respiratory: Nonlabored respirations  Abdomen: soft, appropriately tender around incision sites, prevena holding suction, ARMIN with serosanguinous drainage   Neuro: awake, alert and answering questions appropriately     LABS:                        8.1    10.67 )-----------( 520      ( 19 Jun 2025 05:09 )             27.5     06-19    139  |  105  |  7   ----------------------------<  105[H]  3.9   |  25  |  0.75    Ca    8.7      19 Jun 2025 05:09  Phos  2.5     06-19  Mg     1.90     06-19        Urinalysis Basic - ( 19 Jun 2025 05:09 )    Color: x / Appearance: x / SG: x / pH: x  Gluc: 105 mg/dL / Ketone: x  / Bili: x / Urobili: x   Blood: x / Protein: x / Nitrite: x   Leuk Esterase: x / RBC: x / WBC x   Sq Epi: x / Non Sq Epi: x / Bacteria: x                RADIOLOGY:

## 2025-06-19 NOTE — PROGRESS NOTE ADULT - ASSESSMENT
A/P: 89F PMH DM, CAD, HTN, HLD, and recently diagnosed Colon Ca. Patient was recently diagnosed with colon cancer at [facility] where she was admitted for a lower GI bleed and low Hg of 6.5, per son, patient had blood in the stool for several months before admission. 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 Incidental left common femoral vein filling defect noted on CTAP imaging on this admission (6/10/25)concerning for DVT s/p IVC filter 6/11. S/p IR drainage of abdominal collection 6/12. IR cultures returned pansensitive E. coli. Now s/p open LAR, small bowel resection, drainage of abdominal abscess 6/16/25.     Plan   - Diet: LRD, encourage PO intake, nutrition consult  - Pain: Tylenol and PRN oxycodone   - Abx: zosyn for 4 days post-operatively   - d/c prevena on POD5  - DVT ppx: lovenox     - Strict I&O's  - Monitor ARMIN drain output  - Dispo Planning  - PT: encourage ambulation    A Team Surgery  #[id number]

## 2025-06-19 NOTE — PROGRESS NOTE ADULT - ASSESSMENT
89F w/ PMH of HTN, HLD, T2DM, CAD here for new dx of colonic mass likely colon CA admitted for fever, large RP mass, and possible LLE DVT. Cardiology consulted for preop cardiac eval for IR drain of the the retroperitoneal abscess.     -TTE w/ severe MR, +WMAs. euvolemic, no cp. neg trop and CKMB   -US w/ L above the knee DVT, s/p IVC filter   -s/p IR drain and LAR with colorectal surgery   -cont abx per primary team   -outpt cardiology f/u     Flavio Harris MD [facility]  Attending Interventional Cardiologist, [facility]-NS/GENARO.   Avaliable on [employer] Team

## 2025-06-20 ENCOUNTER — TRANSCRIPTION ENCOUNTER (OUTPATIENT)
Age: 89
End: 2025-06-20

## 2025-06-20 VITALS
SYSTOLIC BLOOD PRESSURE: 139 MMHG | HEART RATE: 75 BPM | DIASTOLIC BLOOD PRESSURE: 52 MMHG | RESPIRATION RATE: 18 BRPM | TEMPERATURE: 98 F | OXYGEN SATURATION: 94 %

## 2025-06-20 LAB
ANION GAP SERPL CALC-SCNC: 8 MMOL/L — SIGNIFICANT CHANGE UP (ref 7–14)
BUN SERPL-MCNC: 6 MG/DL — LOW (ref 7–23)
CALCIUM SERPL-MCNC: 8.7 MG/DL — SIGNIFICANT CHANGE UP (ref 8.4–10.5)
CHLORIDE SERPL-SCNC: 102 MMOL/L — SIGNIFICANT CHANGE UP (ref 98–107)
CO2 SERPL-SCNC: 27 MMOL/L — SIGNIFICANT CHANGE UP (ref 22–31)
CREAT SERPL-MCNC: 0.79 MG/DL — SIGNIFICANT CHANGE UP (ref 0.5–1.3)
EGFR: 70 ML/MIN/1.73M2 — SIGNIFICANT CHANGE UP
EGFR: 70 ML/MIN/1.73M2 — SIGNIFICANT CHANGE UP
GLUCOSE SERPL-MCNC: 117 MG/DL — HIGH (ref 70–99)
HCT VFR BLD CALC: 26.7 % — LOW (ref 34.5–45)
HGB BLD-MCNC: 7.8 G/DL — LOW (ref 11.5–15.5)
MAGNESIUM SERPL-MCNC: 2.1 MG/DL — SIGNIFICANT CHANGE UP (ref 1.6–2.6)
MCHC RBC-ENTMCNC: 21.8 PG — LOW (ref 27–34)
MCHC RBC-ENTMCNC: 29.2 G/DL — LOW (ref 32–36)
MCV RBC AUTO: 74.8 FL — LOW (ref 80–100)
NRBC # BLD AUTO: 0 K/UL — SIGNIFICANT CHANGE UP (ref 0–0)
NRBC # FLD: 0 K/UL — SIGNIFICANT CHANGE UP (ref 0–0)
NRBC BLD AUTO-RTO: 0 /100 WBCS — SIGNIFICANT CHANGE UP (ref 0–0)
PHOSPHATE SERPL-MCNC: 2.6 MG/DL — SIGNIFICANT CHANGE UP (ref 2.5–4.5)
PLATELET # BLD AUTO: 436 K/UL — HIGH (ref 150–400)
PMV BLD: 9.6 FL — SIGNIFICANT CHANGE UP (ref 7–13)
POTASSIUM SERPL-MCNC: 4 MMOL/L — SIGNIFICANT CHANGE UP (ref 3.5–5.3)
POTASSIUM SERPL-SCNC: 4 MMOL/L — SIGNIFICANT CHANGE UP (ref 3.5–5.3)
RBC # BLD: 3.57 M/UL — LOW (ref 3.8–5.2)
RBC # FLD: 26.5 % — HIGH (ref 10.3–14.5)
SODIUM SERPL-SCNC: 137 MMOL/L — SIGNIFICANT CHANGE UP (ref 135–145)
WBC # BLD: 9.54 K/UL — SIGNIFICANT CHANGE UP (ref 3.8–10.5)
WBC # FLD AUTO: 9.54 K/UL — SIGNIFICANT CHANGE UP (ref 3.8–10.5)

## 2025-06-20 PROCEDURE — 99233 SBSQ HOSP IP/OBS HIGH 50: CPT

## 2025-06-20 RX ORDER — OXYCODONE HYDROCHLORIDE 30 MG/1
1 TABLET ORAL
Qty: 0 | Refills: 0 | DISCHARGE
Start: 2025-06-20

## 2025-06-20 RX ORDER — METOPROLOL SUCCINATE 50 MG/1
0 TABLET, EXTENDED RELEASE ORAL
Qty: 0 | Refills: 0 | DISCHARGE

## 2025-06-20 RX ORDER — SERTRALINE 100 MG/1
1 TABLET, FILM COATED ORAL
Qty: 0 | Refills: 0 | DISCHARGE
Start: 2025-06-20

## 2025-06-20 RX ORDER — NITROGLYCERIN 20 MG/G
1 OINTMENT TOPICAL DAILY
Refills: 0 | Status: DISCONTINUED | OUTPATIENT
Start: 2025-06-20 | End: 2025-06-20

## 2025-06-20 RX ORDER — SERTRALINE 100 MG/1
0 TABLET, FILM COATED ORAL
Qty: 0 | Refills: 0 | DISCHARGE

## 2025-06-20 RX ORDER — ACETAMINOPHEN 500 MG/5ML
2 LIQUID (ML) ORAL
Qty: 0 | Refills: 0 | DISCHARGE
Start: 2025-06-20

## 2025-06-20 RX ORDER — AMLODIPINE BESYLATE 10 MG/1
1 TABLET ORAL
Qty: 0 | Refills: 0 | DISCHARGE
Start: 2025-06-20

## 2025-06-20 RX ORDER — ACETAMINOPHEN 500 MG/5ML
1 LIQUID (ML) ORAL
Qty: 28 | Refills: 0
Start: 2025-06-20 | End: 2025-06-26

## 2025-06-20 RX ORDER — NITROGLYCERIN 20 MG/G
0 OINTMENT TOPICAL
Qty: 0 | Refills: 0 | DISCHARGE

## 2025-06-20 RX ORDER — ACETAMINOPHEN 500 MG/5ML
1000 LIQUID (ML) ORAL EVERY 6 HOURS
Refills: 0 | Status: DISCONTINUED | OUTPATIENT
Start: 2025-06-20 | End: 2025-06-20

## 2025-06-20 RX ORDER — LOSARTAN POTASSIUM 100 MG/1
25 TABLET, FILM COATED ORAL DAILY
Refills: 0 | Status: DISCONTINUED | OUTPATIENT
Start: 2025-06-20 | End: 2025-06-20

## 2025-06-20 RX ORDER — SODIUM PHOSPHATE,DIBASIC DIHYD
15 POWDER (GRAM) MISCELLANEOUS ONCE
Refills: 0 | Status: COMPLETED | OUTPATIENT
Start: 2025-06-20 | End: 2025-06-20

## 2025-06-20 RX ORDER — ATORVASTATIN CALCIUM 80 MG/1
0 TABLET, FILM COATED ORAL
Qty: 0 | Refills: 0 | DISCHARGE

## 2025-06-20 RX ORDER — ATORVASTATIN CALCIUM 80 MG/1
1 TABLET, FILM COATED ORAL
Qty: 0 | Refills: 0 | DISCHARGE
Start: 2025-06-20

## 2025-06-20 RX ORDER — OXYCODONE HYDROCHLORIDE 30 MG/1
1 TABLET ORAL
Qty: 15 | Refills: 0
Start: 2025-06-20 | End: 2025-06-24

## 2025-06-20 RX ORDER — DOCUSATE SODIUM 100 MG
1 CAPSULE ORAL
Qty: 10 | Refills: 0
Start: 2025-06-20 | End: 2025-06-24

## 2025-06-20 RX ORDER — NITROGLYCERIN 20 MG/G
1 OINTMENT TOPICAL
Qty: 0 | Refills: 0 | DISCHARGE
Start: 2025-06-20

## 2025-06-20 RX ORDER — LOSARTAN POTASSIUM 100 MG/1
0 TABLET, FILM COATED ORAL
Qty: 0 | Refills: 0 | DISCHARGE

## 2025-06-20 RX ORDER — LOSARTAN POTASSIUM 100 MG/1
1 TABLET, FILM COATED ORAL
Qty: 0 | Refills: 0 | DISCHARGE
Start: 2025-06-20

## 2025-06-20 RX ORDER — AMLODIPINE BESYLATE 10 MG/1
0 TABLET ORAL
Qty: 0 | Refills: 0 | DISCHARGE

## 2025-06-20 RX ORDER — METOPROLOL SUCCINATE 50 MG/1
1 TABLET, EXTENDED RELEASE ORAL
Qty: 0 | Refills: 0 | DISCHARGE
Start: 2025-06-20

## 2025-06-20 RX ADMIN — Medication 1000 MILLIGRAM(S): at 12:29

## 2025-06-20 RX ADMIN — METOPROLOL SUCCINATE 25 MILLIGRAM(S): 50 TABLET, EXTENDED RELEASE ORAL at 05:03

## 2025-06-20 RX ADMIN — Medication 1000 MILLIGRAM(S): at 06:09

## 2025-06-20 RX ADMIN — Medication 25 GRAM(S): at 05:03

## 2025-06-20 RX ADMIN — WHITE PETROLATUM 1 APPLICATION(S): 1 OINTMENT TOPICAL at 11:30

## 2025-06-20 RX ADMIN — Medication 63.75 MILLIMOLE(S): at 09:06

## 2025-06-20 RX ADMIN — AMLODIPINE BESYLATE 5 MILLIGRAM(S): 10 TABLET ORAL at 05:00

## 2025-06-20 RX ADMIN — SERTRALINE 25 MILLIGRAM(S): 100 TABLET, FILM COATED ORAL at 11:30

## 2025-06-20 RX ADMIN — OXYCODONE HYDROCHLORIDE 5 MILLIGRAM(S): 30 TABLET ORAL at 00:14

## 2025-06-20 RX ADMIN — Medication 1000 MILLIGRAM(S): at 11:29

## 2025-06-20 NOTE — CHART NOTE - NSCHARTNOTEFT_GEN_A_CORE
Discussion of incidental finding on CT with patient HCP son PENELOPE explained finding below and instruction to follow up.    LIVER: Right hepatic simple cysts and calcifications. Cysts measure up to 1.4 cm and right hepatic lobe calcifications measuring about 6 mm in the inferior right hepatic lobe and 4 mm in the superior right hepatic lobe.  BILE DUCTS: Normal caliber.  GALLBLADDER: Within normal limits.  SPLEEN: Within normal limits.  PANCREAS: Within normal limits.  ADRENALS: Within normal limits.  KIDNEYS/URETERS: No hydronephrosis. Symmetric nephrograms. Bilateral renal cysts measuring up to 4 cm on the left. Few too small to characterize left cortical hypodensities. Indeterminate 1.3 cm left lower pole lesion (50 HU).

## 2025-06-20 NOTE — DISCHARGE NOTE NURSING/CASE MANAGEMENT/SOCIAL WORK - NSSCNAMETXT_GEN_ALL_CORE
1) [facility] ph #: [phone] Home health aide to be reinstated 1) [facility] ph #: [phone] Home health aide to be reinstated today 6/20/25. 1) [facility] ph #: [phone] Home health aide to be reinstated today 6/20/25.  2) [facility]  ph#[phone]  Visiting RN to see patient day after discharge and will call prior to visit. Physical therapy to follow another time and will call prior to visit.

## 2025-06-20 NOTE — PROGRESS NOTE ADULT - SUBJECTIVE AND OBJECTIVE BOX
[facility] Attending Follow-up Note     Patient seen and examined at bedside. 6/20/2025     Overnight Events:     events noted     REVIEW OF SYSTEMS:  Constitutional:     [x ] negative [ ] fevers [ ] chills [ ] weight loss [ ] weight gain  HEENT:                  [x ] negative [ ] dry eyes [ ] eye irritation [ ] postnasal drip [ ] nasal congestion  CV:                         [ x] negative  [ ] chest pain [ ] orthopnea [ ] palpitations [ ] murmur  Resp:                     [ x] negative [ ] cough [ ] shortness of breath [ ] dyspnea [ ] wheezing [ ] sputum [ ]hemoptysis  GI:                          [ x] negative [ ] nausea [ ] vomiting [ ] diarrhea [ ] constipation [ ] abd pain [ ] dysphagia   :                        [ x] negative [ ] dysuria [ ] nocturia [ ] hematuria [ ] increased urinary frequency  Musculoskeletal: [ x] negative [ ] back pain [ ] myalgias [ ] arthralgias [ ] fracture  Skin:                       [ x] negative [ ] rash [ ] itch  Neurological:        [x ] negative [ ] headache [ ] dizziness [ ] syncope [ ] weakness [ ] numbness  Psychiatric:           [ x] negative [ ] anxiety [ ] depression  Endocrine:            [ x] negative [ ] diabetes [ ] thyroid problem  Heme/Lymph:      [ x] negative [ ] anemia [ ] bleeding problem  Allergic/Immune: [ x] negative [ ] itchy eyes [ ] nasal discharge [ ] hives [ ] angioedema    [ x] All other systems negative  [ ] Unable to assess ROS due to    Current Meds:      PAST MEDICAL & SURGICAL HISTORY:  Hypertension      CAD (coronary artery disease)          Vitals:  T(F): --  HR: --  BP: --  RR: --  SpO2: --  I&O's Summary    20 Jun 2025 07:01  -  21 Jun 2025 07:00  --------------------------------------------------------  IN: 490 mL / OUT: 250 mL / NET: 240 mL        Physical Exam:  Appearance: No acute distress  HENT: No JVD   Cardiovascular: RRR, S1/S2, no murmurs  Respiratory: CTABL  Gastrointestinal: soft, NT ND, +BS  Musculoskeletal: No clubbing, no edema   Neurologic: Non-focal  Skin: No rashes, ecchymoses, or cyanosis                          7.8    9.54  )-----------( 436      ( 20 Jun 2025 05:46 )             26.7     06-20    137  |  102  |  6[L]  ----------------------------<  117[H]  4.0   |  27  |  0.79    Ca    8.7      20 Jun 2025 05:46  Phos  2.6     06-20  Mg     2.10     06-20                    Cardiovascular Testings:

## 2025-06-20 NOTE — CHART NOTE - NSCHARTNOTESELECT_GEN_ALL_CORE
Post Op Check/Event Note
Post procedure check
Nutrition Services
PACU/Event Note
Post procedure check
Pre IR Procedure Note/Event Note
Pre IR procedure Note (IVC FIlter)/Event Note
incidental finding

## 2025-06-20 NOTE — PROGRESS NOTE ADULT - ASSESSMENT
89F PMH DM, CAD, HTN, HLD, and recently diagnosed Colon Ca. Patient was recently diagnosed with colon cancer at [facility] where she was admitted for a lower GI bleed and low Hg of 6.5, per son, patient had blood in the stool for several months before admission. 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 Incidental left common femoral vein filling defect noted on CTAP imaging on this admission (6/10/25)concerning for DVT s/p IVC filter 6/11. S/p IR drainage of abdominal collection 6/12. IR cultures returned pansensitive E. coli. Now s/p open LAR, small bowel resection, drainage of abdominal abscess 6/16/25.     Plan   - Diet: LRD, encourage PO intake, nutrition consult  - Pain: Tylenol and PRN oxycodone   - Abx: zosyn for 4 days post-operatively   - d/c prevena on POD5  - DVT ppx: lovenox     - Strict I&O's  - Monitor ARMIN drain output  - Dispo Planning  - PT: encourage ambulation  - pt with incidental renal pole lesion and adnexal cyst - will need outpatient follow up     [facility]  #[id number]

## 2025-06-20 NOTE — PROGRESS NOTE ADULT - PROVIDER SPECIALTY LIST ADULT
Cardiology
Colorectal Surgery
Colorectal Surgery
Surgery
Surgery
Cardiology
Cardiology
Colorectal Surgery
Colorectal Surgery
Surgery
Surgery
Cardiology
Colorectal Surgery
Intervent Radiology
Surgery
Vascular Surgery

## 2025-06-20 NOTE — DISCHARGE NOTE NURSING/CASE MANAGEMENT/SOCIAL WORK - NSDCPNINST_GEN_ALL_CORE
Pt provided with discharge paperwork. Pt educated on incisional care, home meds, and F/U appointments. Pt stable for discharge. IV removed. Pt provided with supplies for ARMIN drain supplies, pt provided with alcohol pads, Armin drain log, abd pads.

## 2025-06-20 NOTE — PROGRESS NOTE ADULT - SUBJECTIVE AND OBJECTIVE BOX
SURGERY DAILY PROGRESS NOTE    Overnight Events: No acute events overnight    SUBJECTIVE: Patient seen and evaluated on AM rounds. Pt is resting in bed, endorses some soreness with moving. Had increased PO intake yesterday. Passing flatus, decreased diarrhea.   -----------------------------------------------------------------------------------------------------------------------------------------------------------------------------------------------------------  OBJECTIVE:  Vital Signs Last 24 Hrs  T(C): 36.6 (20 Jun 2025 05:00), Max: 37.1 (19 Jun 2025 17:43)  T(F): 97.8 (20 Jun 2025 05:00), Max: 98.7 (19 Jun 2025 17:43)  HR: 64 (20 Jun 2025 05:00) (64 - 75)  BP: 130/50 (20 Jun 2025 05:00) (112/51 - 142/55)  BP(mean): --  RR: 18 (20 Jun 2025 05:00) (18 - 19)  SpO2: 99% (20 Jun 2025 05:00) (96% - 99%)    Parameters below as of 20 Jun 2025 05:00  Patient On (Oxygen Delivery Method): room air      I&O's Detail    19 Jun 2025 07:01  -  20 Jun 2025 07:00  --------------------------------------------------------  IN:    IV PiggyBack: 200 mL    Oral Fluid: 1040 mL  Total IN: 1240 mL    OUT:    Bulb (mL): 107.5 mL    VAC (Vacuum Assisted Closure) System (mL): 0 mL    Voided (mL): 850 mL  Total OUT: 957.5 mL    Total NET: 282.5 mL        Daily     Daily   MEDICATIONS  (STANDING):  acetaminophen     Tablet .. 1000 milliGRAM(s) Oral every 6 hours  amLODIPine   Tablet 5 milliGRAM(s) Oral daily  atorvastatin 40 milliGRAM(s) Oral at bedtime  enoxaparin Injectable 40 milliGRAM(s) SubCutaneous every 24 hours  metoprolol tartrate 25 milliGRAM(s) Oral two times a day  petrolatum white Ointment 1 Application(s) Topical daily  piperacillin/tazobactam IVPB.. 3.375 Gram(s) IV Intermittent every 8 hours  sertraline 25 milliGRAM(s) Oral daily  sodium phosphate 15 milliMole(s)/250 mL IVPB 15 milliMole(s) IV Intermittent once    MEDICATIONS  (PRN):  melatonin 6 milliGRAM(s) Oral at bedtime PRN Insomnia  oxyCODONE    IR 2.5 milliGRAM(s) Oral every 4 hours PRN Moderate Pain (4 - 6)  oxyCODONE    IR 5 milliGRAM(s) Oral every 6 hours PRN Severe Pain (7 - 10)      PHYSICAL EXAM:  General: NAD, resting comfortably in bed  HEENT: Normocephalic atraumatic  Respiratory: Nonlabored respirations  Abdomen: soft, appropriately tender around incision sites, prevena holding suction, ARMIN with serosanguinous drainage   Neuro: awake, alert and answering questions appropriately     LABS:                        7.8    9.54  )-----------( 436      ( 20 Jun 2025 05:46 )             26.7     06-20    137  |  102  |  6[L]  ----------------------------<  117[H]  4.0   |  27  |  0.79    Ca    8.7      20 Jun 2025 05:46  Phos  2.6     06-20  Mg     2.10     06-20        Urinalysis Basic - ( 20 Jun 2025 05:46 )    Color: x / Appearance: x / SG: x / pH: x  Gluc: 117 mg/dL / Ketone: x  / Bili: x / Urobili: x   Blood: x / Protein: x / Nitrite: x   Leuk Esterase: x / RBC: x / WBC x   Sq Epi: x / Non Sq Epi: x / Bacteria: x                RADIOLOGY:

## 2025-06-20 NOTE — DISCHARGE NOTE NURSING/CASE MANAGEMENT/SOCIAL WORK - PATIENT PORTAL LINK FT
You can access the FollowMyHealth Patient Portal offered by [facility] by registering at the following website: [email or web address]. By joining [employer]’s FollowMyHealth portal, you will also be able to view your health information using other applications (apps) compatible with our system.

## 2025-06-20 NOTE — PROGRESS NOTE ADULT - ASSESSMENT
89F w/ PMH of HTN, HLD, T2DM, CAD here for new dx of colonic mass likely colon CA admitted for fever, large RP mass, and possible LLE DVT. Cardiology consulted for preop cardiac eval for IR drain of the the retroperitoneal abscess.     -TTE w/ severe MR, +WMAs. euvolemic, no cp. neg trop and CKMB   -US w/ L above the knee DVT, s/p IVC filter   -s/p IR drain and LAR with colorectal surgery   -cont abx per primary team   -outpt cardiology f/u     Flavio Harris MD [facility]  Attending Interventional Cardiologist, [facility].   [employer] on [employer] Team

## 2025-06-20 NOTE — DISCHARGE NOTE NURSING/CASE MANAGEMENT/SOCIAL WORK - FINANCIAL ASSISTANCE
[facility] provides services at a reduced cost to those who are determined to be eligible through [facility]’s financial assistance program. Information regarding [facility]’s financial assistance program can be found by going to [email or web address] or by calling [phone].

## 2025-06-24 PROBLEM — I10 ESSENTIAL (PRIMARY) HYPERTENSION: Chronic | Status: ACTIVE | Noted: 2025-06-10

## 2025-06-24 PROBLEM — I25.10 ATHEROSCLEROTIC HEART DISEASE OF NATIVE CORONARY ARTERY WITHOUT ANGINA PECTORIS: Chronic | Status: ACTIVE | Noted: 2025-06-10

## 2025-06-27 PROCEDURE — G0452: CPT | Mod: 26

## 2025-07-03 ENCOUNTER — APPOINTMENT (OUTPATIENT)
Dept: COLORECTAL SURGERY | Facility: CLINIC | Age: 89
End: 2025-07-03
Payer: MEDICARE

## 2025-07-03 PROCEDURE — 99024 POSTOP FOLLOW-UP VISIT: CPT

## 2025-07-07 ENCOUNTER — APPOINTMENT (OUTPATIENT)
Dept: CT IMAGING | Facility: HOSPITAL | Age: 89
End: 2025-07-07

## 2025-07-07 ENCOUNTER — OUTPATIENT (OUTPATIENT)
Dept: OUTPATIENT SERVICES | Facility: HOSPITAL | Age: 89
LOS: 1 days | End: 2025-07-07

## 2025-07-07 DIAGNOSIS — C18.9 MALIGNANT NEOPLASM OF COLON, UNSPECIFIED: ICD-10-CM

## 2025-07-07 DIAGNOSIS — R18.8 OTHER ASCITES: ICD-10-CM

## 2025-07-11 ENCOUNTER — APPOINTMENT (OUTPATIENT)
Dept: COLORECTAL SURGERY | Facility: CLINIC | Age: 89
End: 2025-07-11
Payer: MEDICARE

## 2025-07-11 PROBLEM — C18.7 MALIGNANT NEOPLASM OF SIGMOID COLON: Status: ACTIVE | Noted: 2025-07-03

## 2025-07-11 PROCEDURE — 99024 POSTOP FOLLOW-UP VISIT: CPT

## 2025-08-05 ENCOUNTER — APPOINTMENT (OUTPATIENT)
Dept: COLORECTAL SURGERY | Facility: CLINIC | Age: 89
End: 2025-08-05

## (undated) DEVICE — BLADE SCALPEL SAFETYLOCK #15

## (undated) DEVICE — FOLEY CATH 3-WAY 26FR 30CC LATEX LUBRICATH

## (undated) DEVICE — SUT MAXON 1 36" GS-24

## (undated) DEVICE — ELCTR GROUNDING PAD ADULT COVIDIEN

## (undated) DEVICE — BLADE SCALPEL SAFETYLOCK #10

## (undated) DEVICE — SUT SILK 3-0 18" SH (POP-OFF)

## (undated) DEVICE — DRAPE MAYO STAND 23"

## (undated) DEVICE — ANESTHESIA CIRCUIT ADULT HMEF

## (undated) DEVICE — STAPLER SKIN MULTI DIRECTION W35

## (undated) DEVICE — GELPORT LAPAROSCOPIC SYSTEM

## (undated) DEVICE — TUBING IRR SET FOR CYSTOSCOPY 77"

## (undated) DEVICE — ELCTR BOVIE TIP BLADE INSULATED 2.75" EDGE

## (undated) DEVICE — TUBING OLYMPUS INSUFFLATION

## (undated) DEVICE — GRASPER LAPA 5MMX35CM

## (undated) DEVICE — VENODYNE/SCD SLEEVE CALF MEDIUM

## (undated) DEVICE — POSITIONER PINK PAD PIGAZZI SYSTEM

## (undated) DEVICE — D HELP - CLEARVIEW CLEARIFY SYSTEM

## (undated) DEVICE — LIGASURE IMPACT

## (undated) DEVICE — BASIN SET SINGLE

## (undated) DEVICE — BAG URINE W METER 2L

## (undated) DEVICE — GLV 7.5 PROTEXIS (BLUE)

## (undated) DEVICE — SUCTION YANKAUER OPEN TIP NO VENT CURVE

## (undated) DEVICE — TUBING SUCTION NONCONDUCTIVE 6MM X 12FT

## (undated) DEVICE — DRSG OPSITE 2.5 X 2"

## (undated) DEVICE — SUT VICRYL 0 27" UR-6

## (undated) DEVICE — PACK GENERAL LAPAROSCOPY

## (undated) DEVICE — DRAPE 3/4 SHEET 52X76"

## (undated) DEVICE — TUBING INSUFFLATION LAP FILTER 10FT

## (undated) DEVICE — DRSG STERISTRIPS 0.5 X 4"

## (undated) DEVICE — DRAIN BLAKE 15FR BARD CHANNEL

## (undated) DEVICE — FOLEY TRAY 16FR 5CC LF UMETER CLOSED

## (undated) DEVICE — WARMING BLANKET FULL ADULT

## (undated) DEVICE — POOLE SUCTION TIP

## (undated) DEVICE — DRAIN RESERVOIR FOR JACKSON PRATT 100CC CARDINAL

## (undated) DEVICE — TROCAR COVIDIEN VERSAPORT BLADELESS OPTICAL 12MM STANDARD

## (undated) DEVICE — ELCTR CAUTERY TIP CLEANER 2 X 2"

## (undated) DEVICE — ELCTR BOVIE TIP BLADE INSULATED 6.5" EDGE

## (undated) DEVICE — DRAPE WARMING SOLUTION 44 X 44"

## (undated) DEVICE — POSITIONER FOAM EGG CRATE ULNAR 2PCS (PINK)

## (undated) DEVICE — LABELS BLANK W PEN

## (undated) DEVICE — GLV 7 PROTEXIS (WHITE)

## (undated) DEVICE — Device

## (undated) DEVICE — LIGASURE BLUNT TIP 5MM X 37CM

## (undated) DEVICE — DRSG PREVENA PEEL & PLACE KIT 20CM

## (undated) DEVICE — ELCTR BOVIE PENCIL SMOKE EVACUATION

## (undated) DEVICE — TROCAR COVIDIEN BLUNT TIP HASSAN 12MM